# Patient Record
Sex: FEMALE | Race: WHITE | Employment: UNEMPLOYED | URBAN - METROPOLITAN AREA
[De-identification: names, ages, dates, MRNs, and addresses within clinical notes are randomized per-mention and may not be internally consistent; named-entity substitution may affect disease eponyms.]

---

## 2017-07-10 PROBLEM — G47.33 OBSTRUCTIVE SLEEP APNEA SYNDROME: Status: ACTIVE | Noted: 2017-07-10

## 2017-07-10 PROBLEM — E78.00 HIGH CHOLESTEROL: Status: ACTIVE | Noted: 2017-07-10

## 2017-07-10 PROBLEM — G25.81 RESTLESS LEG SYNDROME: Status: ACTIVE | Noted: 2017-07-10

## 2017-07-10 PROBLEM — E03.9 HYPOTHYROIDISM, UNSPECIFIED TYPE: Status: ACTIVE | Noted: 2017-07-10

## 2018-04-28 PROCEDURE — 81003 URINALYSIS AUTO W/O SCOPE: CPT | Performed by: INTERNAL MEDICINE

## 2018-04-28 PROCEDURE — 82607 VITAMIN B-12: CPT | Performed by: INTERNAL MEDICINE

## 2018-04-28 PROCEDURE — 82746 ASSAY OF FOLIC ACID SERUM: CPT | Performed by: INTERNAL MEDICINE

## 2018-10-06 PROBLEM — K42.0 INCARCERATED UMBILICAL HERNIA: Status: ACTIVE | Noted: 2018-10-06

## 2018-10-10 PROCEDURE — 81001 URINALYSIS AUTO W/SCOPE: CPT | Performed by: INTERNAL MEDICINE

## 2018-11-15 PROCEDURE — 81003 URINALYSIS AUTO W/O SCOPE: CPT | Performed by: INTERNAL MEDICINE

## 2019-01-07 PROBLEM — R10.2 SUPRAPUBIC PRESSURE: Status: ACTIVE | Noted: 2019-01-07

## 2019-05-28 PROBLEM — E89.0 POSTSURGICAL HYPOTHYROIDISM: Status: ACTIVE | Noted: 2017-07-10

## 2019-05-28 PROBLEM — Z85.850 HISTORY OF THYROID CANCER: Status: ACTIVE | Noted: 2019-05-28

## 2019-05-28 PROCEDURE — 86800 THYROGLOBULIN ANTIBODY: CPT | Performed by: INTERNAL MEDICINE

## 2019-05-28 PROCEDURE — 84432 ASSAY OF THYROGLOBULIN: CPT | Performed by: INTERNAL MEDICINE

## 2020-02-19 PROBLEM — M17.12 PRIMARY OSTEOARTHRITIS OF LEFT KNEE: Status: ACTIVE | Noted: 2020-02-19

## 2020-02-19 PROBLEM — M76.60 ACHILLES TENDINITIS, UNSPECIFIED LATERALITY: Status: ACTIVE | Noted: 2020-02-19

## 2020-05-21 PROBLEM — M17.11 PRIMARY OSTEOARTHRITIS OF RIGHT KNEE: Status: ACTIVE | Noted: 2020-05-21

## 2020-08-24 PROBLEM — M17.0 BILATERAL PRIMARY OSTEOARTHRITIS OF KNEE: Status: ACTIVE | Noted: 2020-08-24

## 2020-09-08 PROBLEM — S82.021A: Status: ACTIVE | Noted: 2020-09-08

## 2020-09-08 PROBLEM — M25.571 ACUTE RIGHT ANKLE PAIN: Status: ACTIVE | Noted: 2020-09-08

## 2020-10-20 PROBLEM — S82.021D: Status: ACTIVE | Noted: 2020-09-08

## 2020-10-20 PROBLEM — S52.022D: Status: ACTIVE | Noted: 2020-10-20

## 2021-04-14 PROBLEM — M17.0 PRIMARY OSTEOARTHRITIS OF BOTH KNEES: Status: ACTIVE | Noted: 2020-08-24

## 2025-04-09 RX ORDER — SEMAGLUTIDE 1.7 MG/.75ML
1.7 INJECTION, SOLUTION SUBCUTANEOUS WEEKLY
Status: ON HOLD | COMMUNITY
Start: 2024-11-26 | End: 2025-04-17

## 2025-04-11 NOTE — CM/SW NOTE
SW was notified the pt. Has been pre-operatively arranged with [facility].  Referral, order and face to face to be sent via [facility] post operatively.    TAYE Caputo ext [phone]

## 2025-04-15 RX ORDER — HYDROCODONE BITARTRATE AND ACETAMINOPHEN 10; 325 MG/1; MG/1
1 TABLET ORAL EVERY 6 HOURS PRN
COMMUNITY
Start: 2025-04-07

## 2025-04-15 RX ORDER — MELOXICAM 15 MG/1
15 TABLET ORAL DAILY
COMMUNITY
Start: 2025-01-29

## 2025-04-15 RX ORDER — TIRZEPATIDE 5 MG/.5ML
5 INJECTION, SOLUTION SUBCUTANEOUS WEEKLY
COMMUNITY
Start: 2025-04-15

## 2025-04-15 RX ORDER — ONDANSETRON 4 MG/1
4 TABLET, ORALLY DISINTEGRATING ORAL EVERY 8 HOURS PRN
COMMUNITY
Start: 2025-04-03

## 2025-04-15 RX ORDER — LEVOTHYROXINE SODIUM 150 UG/1
150 TABLET ORAL
COMMUNITY

## 2025-04-15 RX ORDER — KETOCONAZOLE 20 MG/G
1 CREAM TOPICAL DAILY
COMMUNITY
Start: 2025-03-01

## 2025-04-15 RX ORDER — MULTIVITAMIN,THERAPEUTIC
1 TABLET ORAL DAILY
COMMUNITY
End: 2025-04-15

## 2025-04-16 ENCOUNTER — ANESTHESIA EVENT (OUTPATIENT)
Dept: SURGERY | Facility: HOSPITAL | Age: 60
End: 2025-04-16
Payer: COMMERCIAL

## 2025-04-16 ENCOUNTER — LAB ENCOUNTER (OUTPATIENT)
Dept: LAB | Age: 60
End: 2025-04-16
Attending: ORTHOPAEDIC SURGERY
Payer: COMMERCIAL

## 2025-04-16 DIAGNOSIS — Z01.818 PRE-OP TESTING: ICD-10-CM

## 2025-04-16 LAB — MRSA DNA SPEC QL NAA+PROBE: NEGATIVE

## 2025-04-16 PROCEDURE — 87641 MR-STAPH DNA AMP PROBE: CPT

## 2025-04-16 NOTE — ANESTHESIA PREPROCEDURE EVALUATION
Anesthesia PreOp Note    HPI:     Millie Mñuiz is a 59 year old female who presents for preoperative consultation requested by: Augusto Reyna MD    Date of Surgery: 4/17/2025    Procedure(s):  Left total knee arthroplasty  Indication: Primary osteoarthritis of left knee    Relevant Problems   No relevant active problems       NPO:                         History Review:  Patient Active Problem List    Diagnosis Date Noted    Closed extra-articular fracture of olecranon process of left ulna with routine healing, subsequent encounter 10/20/2020    Acute right ankle pain 09/08/2020    Closed vertical fracture of right patella with routine healing, subsequent encounter 09/08/2020    Primary osteoarthritis of both knees 08/24/2020    Primary osteoarthritis of right knee 05/21/2020    Primary osteoarthritis of left knee 02/19/2020    Achilles tendinitis, unspecified laterality 02/19/2020    History of thyroid cancer 05/28/2019    Suprapubic pressure 01/07/2019    Incarcerated umbilical hernia 10/06/2018    Postsurgical hypothyroidism 07/10/2017    Obstructive sleep apnea syndrome 07/10/2017    High cholesterol 07/10/2017    Restless leg syndrome 07/10/2017       Past Medical History[1]    Past Surgical History[2]    Prescriptions Prior to Admission[3]  Current Medications and Prescriptions Ordered in Epic[4]    Allergies[5]    Family History[6]  Social Hx on file[7]    Available pre-op labs reviewed.             Vital Signs:  Body mass index is 40.77 kg/m².   height is 1.651 m (5' 5\") and weight is 111.1 kg (245 lb).   Vitals:    04/15/25 1210   Weight: 111.1 kg (245 lb)   Height: 1.651 m (5' 5\")        Anesthesia Evaluation     Patient summary reviewed and Nursing notes reviewed    History of anesthetic complications   Airway   Mallampati: II  TM distance: >3 FB  Neck ROM: full  Dental - Dentition appears grossly intact     Pulmonary - normal exam   (+) asthma, sleep apnea  Cardiovascular - normal exam  (+)  hypertension    ECG reviewed    Neuro/Psych    (+)  anxiety/panic attacks,  depression      GI/Hepatic/Renal    (+) GERD    Endo/Other    (+) diabetes mellitus    Comments: M. obese  Abdominal   (+) obese                 Anesthesia Plan:   ASA:  3  Plan:   Spinal  Post-op Pain Management: Saphenous block  Informed Consent Plan and Risks Discussed With:  Patient      I have informed Millie Muñiz and/or legal guardian or family member of the nature of the anesthetic plan, benefits, risks including possible dental damage if relevant, major complications, and any alternative forms of anesthetic management.   All of the patient's questions were answered to the best of my ability. The patient desires the anesthetic management as planned.  Dom Hutchins MD  4/16/2025 4:37 PM  Present on Admission:  **None**           [1]   Past Medical History:   Anxiety    Asthma (HCC)    seasonal    Cancer (HCC)    thyroid, melanoma    Depression    Disorder of thyroid    Esophageal reflux    Essential hypertension    High blood pressure    History of thyroid cancer    Hx of motion sickness    Hyperlipidemia    Neuropathy    Obesity    Osteoarthritis    PONV (postoperative nausea and vomiting)    Sleep apnea    CPAP    Visual impairment    glasses   [2]   Past Surgical History:  Procedure Laterality Date    Hernia surgery      Other surgical history  2005    melanoma removed from back    Other surgical history Right 10/2024    triceps repair    Thyroidectomy      Umbilical hernia repair  10/12/2018    lap. umbillical hernia repair w/mesh & partial omentectomy   [3]   No medications prior to admission.   [4]   Current Facility-Administered Medications Ordered in Epic   Medication Dose Route Frequency Provider Last Rate Last Admin    [START ON 4/17/2025] clonidine-EPINEPHrine-ropivacaine-ketorolac (CERTS) (Duraclon-Adrenalin-Naropin-Toradol) pain cocktail irrigation   Intra-articular Once (Intra-Op) Augusto Reyna MD         Current  Outpatient Medications Ordered in Epic   Medication Sig Dispense Refill    HYDROcodone-acetaminophen  MG Oral Tab Take 1 tablet by mouth every 6 (six) hours as needed for Pain.      Meloxicam 15 MG Oral Tab Take 1 tablet (15 mg total) by mouth daily.      ondansetron 4 MG Oral Tablet Dispersible Take 1 tablet (4 mg total) by mouth every 8 (eight) hours as needed for Nausea.      ketoconazole 2 % External Cream Apply 1 Application topically daily. Toe fungus      Tirzepatide-Weight Management (ZEPBOUND) 5 MG/0.5ML Subcutaneous Solution Auto-injector Inject 5 mg into the skin once a week.      ibuprofen 100 MG Oral Tab Take 1 tablet (100 mg total) by mouth every 8 (eight) hours as needed for Pain.      levothyroxine 150 MCG Oral Tab Take 1 tablet (150 mcg total) by mouth before breakfast.      semaglutide-weight management (WEGOVY) 1.7 MG/0.75ML Subcutaneous Solution Auto-injector Inject 0.75 mL (1.7 mg total) into the skin once a week.      SERTRALINE 50 MG Oral Tab TAKE 1 TABLET BY MOUTH EVERY DAY 90 tablet 1    Calcium Carbonate-Vitamin D 600-125 MG-UNIT Oral Tab Take by mouth. 1 tablet twice daily      EZETIMIBE 10 MG Oral Tab TAKE 1 TABLET BY MOUTH EVERY DAY AT NIGHT 90 tablet 0    HYDROCHLOROTHIAZIDE 25 MG Oral Tab TAKE 1 TABLET BY MOUTH EVERY DAY 90 tablet 0    Multiple Vitamin (MULTI-VITAMIN DAILY) Oral Tab Take by mouth in the morning.      Potassium (POTASSIMIN OR) Take by mouth in the morning.      Omega-3 Fatty Acids (FISH OIL OR) Take by mouth in the morning.      Pramipexole Dihydrochloride 0.25 MG Oral Tab Patient taking 4 tabs at bedtime  5    omeprazole 20 MG Oral Capsule Delayed Release Take 1 capsule (20 mg total) by mouth every morning before breakfast.      ascorbic acid 1000 MG Oral Tab Take 1 tablet (1,000 mg total) by mouth daily.      Cholecalciferol 50 MCG (2000 UT) Oral Cap Take 1 tablet by mouth daily.      aspirin 81 MG Oral Chew Tab Chew by mouth daily.      PROAIR  (90 Base)  MCG/ACT Inhalation Aero Soln INHALE 2 PUFFS FOUR TIMES DAILY AS NEEDED. (Patient not taking: No sig reported) 8.5 Inhaler 3   [5]   Allergies  Allergen Reactions    Bees FEVER    Triple Antibiotic [Neomycin-Bacitracin-Polymyxin] RASH   [6]   Family History  Problem Relation Age of Onset    Diabetes Father     Heart Disorder Father     Cancer Father     Lung Disorder Mother     Lipids Mother     Hypertension Mother     Arthritis Mother     Heart Disorder Brother    [7]   Social History  Socioeconomic History    Marital status:    Tobacco Use    Smoking status: Never    Smokeless tobacco: Never   Vaping Use    Vaping status: Never Used   Substance and Sexual Activity    Alcohol use: Not Currently     Comment: very rarely    Drug use: No

## 2025-04-17 ENCOUNTER — HOSPITAL ENCOUNTER (OUTPATIENT)
Facility: HOSPITAL | Age: 60
Discharge: HOME | End: 2025-04-19
Attending: ORTHOPAEDIC SURGERY | Admitting: ORTHOPAEDIC SURGERY
Payer: COMMERCIAL

## 2025-04-17 ENCOUNTER — APPOINTMENT (OUTPATIENT)
Dept: GENERAL RADIOLOGY | Facility: HOSPITAL | Age: 60
End: 2025-04-17
Attending: PHYSICIAN ASSISTANT
Payer: COMMERCIAL

## 2025-04-17 ENCOUNTER — ANESTHESIA (OUTPATIENT)
Dept: SURGERY | Facility: HOSPITAL | Age: 60
End: 2025-04-17
Payer: COMMERCIAL

## 2025-04-17 DIAGNOSIS — Z01.818 PRE-OP TESTING: Primary | ICD-10-CM

## 2025-04-17 PROBLEM — Z96.652 S/P TOTAL KNEE ARTHROPLASTY, LEFT: Status: ACTIVE | Noted: 2025-04-17

## 2025-04-17 PROCEDURE — 64447 NJX AA&/STRD FEMORAL NRV IMG: CPT | Performed by: ORTHOPAEDIC SURGERY

## 2025-04-17 PROCEDURE — 97161 PT EVAL LOW COMPLEX 20 MIN: CPT

## 2025-04-17 PROCEDURE — 97530 THERAPEUTIC ACTIVITIES: CPT

## 2025-04-17 PROCEDURE — 73560 X-RAY EXAM OF KNEE 1 OR 2: CPT | Performed by: PHYSICIAN ASSISTANT

## 2025-04-17 PROCEDURE — 88305 TISSUE EXAM BY PATHOLOGIST: CPT | Performed by: ORTHOPAEDIC SURGERY

## 2025-04-17 PROCEDURE — 0SRD06A REPLACEMENT OF LEFT KNEE JOINT WITH OXIDIZED ZIRCONIUM ON POLYETHYLENE SYNTHETIC SUBSTITUTE, UNCEMENTED, OPEN APPROACH: ICD-10-PCS | Performed by: ORTHOPAEDIC SURGERY

## 2025-04-17 PROCEDURE — 97116 GAIT TRAINING THERAPY: CPT

## 2025-04-17 PROCEDURE — 8E0Y0CZ ROBOTIC ASSISTED PROCEDURE OF LOWER EXTREMITY, OPEN APPROACH: ICD-10-PCS | Performed by: ORTHOPAEDIC SURGERY

## 2025-04-17 PROCEDURE — 97165 OT EVAL LOW COMPLEX 30 MIN: CPT

## 2025-04-17 PROCEDURE — 88311 DECALCIFY TISSUE: CPT | Performed by: ORTHOPAEDIC SURGERY

## 2025-04-17 DEVICE — IMPLANTABLE DEVICE: Type: IMPLANTABLE DEVICE | Site: KNEE | Status: FUNCTIONAL

## 2025-04-17 DEVICE — IMPLANTABLE DEVICE
Type: IMPLANTABLE DEVICE | Site: KNEE | Status: FUNCTIONAL
Brand: PERSONA® THE PERSONALIZED KNEE® OSSEOTI®

## 2025-04-17 DEVICE — IMPLANTABLE DEVICE
Type: IMPLANTABLE DEVICE | Site: KNEE | Status: FUNCTIONAL
Brand: PERSONA® PPS®

## 2025-04-17 DEVICE — IMPLANTABLE DEVICE
Type: IMPLANTABLE DEVICE | Site: KNEE | Status: FUNCTIONAL
Brand: PERSONA® VIVACIT-E®

## 2025-04-17 RX ORDER — ROPIVACAINE HYDROCHLORIDE 5 MG/ML
INJECTION, SOLUTION EPIDURAL; INFILTRATION; PERINEURAL
Status: COMPLETED | OUTPATIENT
Start: 2025-04-17 | End: 2025-04-17

## 2025-04-17 RX ORDER — ASCORBIC ACID 500 MG
1000 TABLET ORAL DAILY
Status: DISCONTINUED | OUTPATIENT
Start: 2025-04-17 | End: 2025-04-19

## 2025-04-17 RX ORDER — ONDANSETRON 2 MG/ML
4 INJECTION INTRAMUSCULAR; INTRAVENOUS EVERY 6 HOURS PRN
Status: DISCONTINUED | OUTPATIENT
Start: 2025-04-17 | End: 2025-04-17 | Stop reason: HOSPADM

## 2025-04-17 RX ORDER — DIPHENHYDRAMINE HYDROCHLORIDE 50 MG/ML
25 INJECTION, SOLUTION INTRAMUSCULAR; INTRAVENOUS ONCE AS NEEDED
Status: ACTIVE | OUTPATIENT
Start: 2025-04-17 | End: 2025-04-17

## 2025-04-17 RX ORDER — ALBUTEROL SULFATE 0.83 MG/ML
2.5 SOLUTION RESPIRATORY (INHALATION) EVERY 6 HOURS PRN
Status: DISCONTINUED | OUTPATIENT
Start: 2025-04-17 | End: 2025-04-19

## 2025-04-17 RX ORDER — FAMOTIDINE 10 MG/ML
20 INJECTION, SOLUTION INTRAVENOUS ONCE
Status: DISCONTINUED | OUTPATIENT
Start: 2025-04-17 | End: 2025-04-17 | Stop reason: HOSPADM

## 2025-04-17 RX ORDER — SENNOSIDES 8.6 MG
17.2 TABLET ORAL NIGHTLY
Status: DISCONTINUED | OUTPATIENT
Start: 2025-04-17 | End: 2025-04-19

## 2025-04-17 RX ORDER — PROCHLORPERAZINE EDISYLATE 5 MG/ML
5 INJECTION INTRAMUSCULAR; INTRAVENOUS EVERY 8 HOURS PRN
Status: DISCONTINUED | OUTPATIENT
Start: 2025-04-17 | End: 2025-04-17 | Stop reason: HOSPADM

## 2025-04-17 RX ORDER — FAMOTIDINE 10 MG/ML
20 INJECTION, SOLUTION INTRAVENOUS 2 TIMES DAILY
Status: DISCONTINUED | OUTPATIENT
Start: 2025-04-17 | End: 2025-04-17

## 2025-04-17 RX ORDER — DIPHENHYDRAMINE HCL 25 MG
25 CAPSULE ORAL EVERY 4 HOURS PRN
Status: DISCONTINUED | OUTPATIENT
Start: 2025-04-17 | End: 2025-04-19

## 2025-04-17 RX ORDER — HYDROCODONE BITARTRATE AND ACETAMINOPHEN 10; 325 MG/1; MG/1
1 TABLET ORAL EVERY 6 HOURS PRN
Status: DISCONTINUED | OUTPATIENT
Start: 2025-04-17 | End: 2025-04-19

## 2025-04-17 RX ORDER — SODIUM PHOSPHATE, DIBASIC AND SODIUM PHOSPHATE, MONOBASIC 7; 19 G/230ML; G/230ML
1 ENEMA RECTAL ONCE AS NEEDED
Status: DISCONTINUED | OUTPATIENT
Start: 2025-04-17 | End: 2025-04-19

## 2025-04-17 RX ORDER — PHENYLEPHRINE HCL 10 MG/ML
VIAL (ML) INJECTION AS NEEDED
Status: DISCONTINUED | OUTPATIENT
Start: 2025-04-17 | End: 2025-04-17 | Stop reason: SURG

## 2025-04-17 RX ORDER — SODIUM CHLORIDE, SODIUM LACTATE, POTASSIUM CHLORIDE, CALCIUM CHLORIDE 600; 310; 30; 20 MG/100ML; MG/100ML; MG/100ML; MG/100ML
INJECTION, SOLUTION INTRAVENOUS CONTINUOUS
Status: DISCONTINUED | OUTPATIENT
Start: 2025-04-17 | End: 2025-04-19

## 2025-04-17 RX ORDER — HYDROMORPHONE HYDROCHLORIDE 1 MG/ML
0.4 INJECTION, SOLUTION INTRAMUSCULAR; INTRAVENOUS; SUBCUTANEOUS EVERY 5 MIN PRN
Status: DISCONTINUED | OUTPATIENT
Start: 2025-04-17 | End: 2025-04-17 | Stop reason: HOSPADM

## 2025-04-17 RX ORDER — DOCUSATE SODIUM 100 MG/1
100 CAPSULE, LIQUID FILLED ORAL 2 TIMES DAILY
Status: DISCONTINUED | OUTPATIENT
Start: 2025-04-17 | End: 2025-04-19

## 2025-04-17 RX ORDER — HYDROMORPHONE HYDROCHLORIDE 1 MG/ML
0.2 INJECTION, SOLUTION INTRAMUSCULAR; INTRAVENOUS; SUBCUTANEOUS EVERY 5 MIN PRN
Status: DISCONTINUED | OUTPATIENT
Start: 2025-04-17 | End: 2025-04-17 | Stop reason: HOSPADM

## 2025-04-17 RX ORDER — PROCHLORPERAZINE EDISYLATE 5 MG/ML
5 INJECTION INTRAMUSCULAR; INTRAVENOUS EVERY 8 HOURS PRN
Status: DISCONTINUED | OUTPATIENT
Start: 2025-04-17 | End: 2025-04-19

## 2025-04-17 RX ORDER — POLYETHYLENE GLYCOL 3350 17 G/17G
17 POWDER, FOR SOLUTION ORAL DAILY PRN
Status: DISCONTINUED | OUTPATIENT
Start: 2025-04-17 | End: 2025-04-19

## 2025-04-17 RX ORDER — CHOLECALCIFEROL (VITAMIN D3) 25 MCG
1000 TABLET ORAL DAILY
Status: DISCONTINUED | OUTPATIENT
Start: 2025-04-17 | End: 2025-04-19

## 2025-04-17 RX ORDER — TRANEXAMIC ACID 650 MG/1
1300 TABLET ORAL ONCE
Status: COMPLETED | OUTPATIENT
Start: 2025-04-17 | End: 2025-04-17

## 2025-04-17 RX ORDER — ASPIRIN 81 MG/1
81 TABLET ORAL 2 TIMES DAILY
Status: DISCONTINUED | OUTPATIENT
Start: 2025-04-17 | End: 2025-04-19

## 2025-04-17 RX ORDER — FAMOTIDINE 20 MG/1
20 TABLET, FILM COATED ORAL ONCE
Status: DISCONTINUED | OUTPATIENT
Start: 2025-04-17 | End: 2025-04-17 | Stop reason: HOSPADM

## 2025-04-17 RX ORDER — LEVOTHYROXINE SODIUM 75 UG/1
150 TABLET ORAL
Status: DISCONTINUED | OUTPATIENT
Start: 2025-04-18 | End: 2025-04-19

## 2025-04-17 RX ORDER — PANTOPRAZOLE SODIUM 40 MG/1
40 TABLET, DELAYED RELEASE ORAL
Status: DISCONTINUED | OUTPATIENT
Start: 2025-04-18 | End: 2025-04-19

## 2025-04-17 RX ORDER — ACETAMINOPHEN 500 MG
1000 TABLET ORAL ONCE
Status: COMPLETED | OUTPATIENT
Start: 2025-04-17 | End: 2025-04-17

## 2025-04-17 RX ORDER — DOXEPIN HYDROCHLORIDE 50 MG/1
CAPSULE ORAL DAILY
Status: DISCONTINUED | OUTPATIENT
Start: 2025-04-17 | End: 2025-04-19

## 2025-04-17 RX ORDER — LIDOCAINE HYDROCHLORIDE 10 MG/ML
INJECTION, SOLUTION INFILTRATION; PERINEURAL
Status: COMPLETED | OUTPATIENT
Start: 2025-04-17 | End: 2025-04-17

## 2025-04-17 RX ORDER — DIPHENHYDRAMINE HYDROCHLORIDE 50 MG/ML
12.5 INJECTION, SOLUTION INTRAMUSCULAR; INTRAVENOUS EVERY 4 HOURS PRN
Status: DISCONTINUED | OUTPATIENT
Start: 2025-04-17 | End: 2025-04-19

## 2025-04-17 RX ORDER — BISACODYL 10 MG
10 SUPPOSITORY, RECTAL RECTAL
Status: DISCONTINUED | OUTPATIENT
Start: 2025-04-17 | End: 2025-04-19

## 2025-04-17 RX ORDER — MIDAZOLAM HYDROCHLORIDE 1 MG/ML
INJECTION INTRAMUSCULAR; INTRAVENOUS
Status: COMPLETED | OUTPATIENT
Start: 2025-04-17 | End: 2025-04-17

## 2025-04-17 RX ORDER — HYDROMORPHONE HYDROCHLORIDE 1 MG/ML
0.6 INJECTION, SOLUTION INTRAMUSCULAR; INTRAVENOUS; SUBCUTANEOUS EVERY 5 MIN PRN
Status: DISCONTINUED | OUTPATIENT
Start: 2025-04-17 | End: 2025-04-17 | Stop reason: HOSPADM

## 2025-04-17 RX ORDER — EZETIMIBE 10 MG/1
10 TABLET ORAL NIGHTLY
Status: DISCONTINUED | OUTPATIENT
Start: 2025-04-17 | End: 2025-04-19

## 2025-04-17 RX ORDER — FAMOTIDINE 20 MG/1
20 TABLET, FILM COATED ORAL 2 TIMES DAILY
Status: DISCONTINUED | OUTPATIENT
Start: 2025-04-17 | End: 2025-04-17

## 2025-04-17 RX ORDER — BUPIVACAINE HYDROCHLORIDE 7.5 MG/ML
INJECTION, SOLUTION INTRASPINAL
Status: COMPLETED | OUTPATIENT
Start: 2025-04-17 | End: 2025-04-17

## 2025-04-17 RX ORDER — HYDROCODONE BITARTRATE AND ACETAMINOPHEN 10; 325 MG/1; MG/1
1 TABLET ORAL ONCE AS NEEDED
Refills: 0 | Status: COMPLETED | OUTPATIENT
Start: 2025-04-17 | End: 2025-04-17

## 2025-04-17 RX ORDER — ONDANSETRON 2 MG/ML
4 INJECTION INTRAMUSCULAR; INTRAVENOUS EVERY 6 HOURS PRN
Status: DISCONTINUED | OUTPATIENT
Start: 2025-04-17 | End: 2025-04-19

## 2025-04-17 RX ORDER — NALOXONE HYDROCHLORIDE 0.4 MG/ML
0.08 INJECTION, SOLUTION INTRAMUSCULAR; INTRAVENOUS; SUBCUTANEOUS AS NEEDED
Status: DISCONTINUED | OUTPATIENT
Start: 2025-04-17 | End: 2025-04-17 | Stop reason: HOSPADM

## 2025-04-17 RX ORDER — DEXAMETHASONE SODIUM PHOSPHATE 10 MG/ML
INJECTION, SOLUTION INTRAMUSCULAR; INTRAVENOUS
Status: COMPLETED | OUTPATIENT
Start: 2025-04-17 | End: 2025-04-17

## 2025-04-17 RX ADMIN — MIDAZOLAM HYDROCHLORIDE 2 MG: 1 INJECTION INTRAMUSCULAR; INTRAVENOUS at 06:53:00

## 2025-04-17 RX ADMIN — LIDOCAINE HYDROCHLORIDE 5 ML: 10 INJECTION, SOLUTION INFILTRATION; PERINEURAL at 07:18:00

## 2025-04-17 RX ADMIN — SODIUM CHLORIDE, SODIUM LACTATE, POTASSIUM CHLORIDE, CALCIUM CHLORIDE: 600; 310; 30; 20 INJECTION, SOLUTION INTRAVENOUS at 09:03:00

## 2025-04-17 RX ADMIN — DEXAMETHASONE SODIUM PHOSPHATE 10 MG: 10 INJECTION, SOLUTION INTRAMUSCULAR; INTRAVENOUS at 06:53:00

## 2025-04-17 RX ADMIN — LIDOCAINE HYDROCHLORIDE 5 ML: 10 INJECTION, SOLUTION INFILTRATION; PERINEURAL at 06:53:00

## 2025-04-17 RX ADMIN — LIDOCAINE HYDROCHLORIDE 5 ML: 10 INJECTION, SOLUTION INFILTRATION; PERINEURAL at 07:26:00

## 2025-04-17 RX ADMIN — PHENYLEPHRINE HCL 100 MCG: 10 MG/ML VIAL (ML) INJECTION at 07:41:00

## 2025-04-17 RX ADMIN — BUPIVACAINE HYDROCHLORIDE 1.5 ML: 7.5 INJECTION, SOLUTION INTRASPINAL at 07:18:00

## 2025-04-17 RX ADMIN — ROPIVACAINE HYDROCHLORIDE 30 ML: 5 INJECTION, SOLUTION EPIDURAL; INFILTRATION; PERINEURAL at 06:53:00

## 2025-04-17 NOTE — OPERATIVE REPORT
PATIENT'S NAME: Millie Muñiz    ATTENDING PHYSICIAN: Augusto Reyna MD   OPERATING PHYSICIAN: Augusto Reyna MD   PATIENT ACCOUNT#: [id number]   MEDICAL RECORD #: [id number]   YOB: 1965          ADMISSION DATE: 4/17/2025           OPERATION DATE: 4/17/2025       OPERATIVE REPORT        PREOPERATIVE DIAGNOSIS: Left knee osteoarthritis.  POSTOPERATIVE DIAGNOSIS:  Left knee osteoarthritis.  PROCEDURE:  Robotic assisted left total knee arthroplasty.     COMPLICATIONS:  None.     ANESTHESIA:  Spinal plus adductor block.     SPECIMENS REMOVED:  Bony components sent to Pathology.     IMPLANTS USED:  Karen Persona cementless femoral component size 7 left standard CR; cementless tibial component, size E; cementless patella, size  35; articular insert, size 14.       TOURNIQUET TIME:  approx 46 minutes.     BLOOD LOSS:  Approximately 100 mL.     ASSISTANTS:  Mishel Castaneda PA-C, and SAHIL Hernandez.       INDICATIONS:  The patient is a pleasant 59-year-old woman who has failed nonoperative treatment of left knee osteoarthritis.  We discussed risks and benefits of operative intervention going forward with the surgery to include infection, stiffness, neurovascular injury, anesthetic risk, continued pain, possible need for further surgery, DVT, PE. She understood these risks and desired to proceed.     OPERATIVE TECHNIQUE:  After adequate induction of spinal anesthesia and adductor block, left lower extremity was prepped and draped in the usual sterile fashion after application of well-padded tourniquet to the left upper thigh.  Prior to the case, the leg was exsanguinated, tourniquet taken up to 250 mmHg.  At this point, a standard midline incision was made, followed by a medial parapatellar arthrotomy.  Patella was everted and knee was flexed.  Fat pad was removed.  ACL was removed.  At this time, tracking pins were placed.  The robotic arm was then used to placed distal femoral pins, drill 4-in-1  cutting holes, and place proximal tibial pins.  Distal cut was made.  A 4-in-1 cutting block was placed.  Anterior, posterior, and chamfer cuts were then made.  PCL retractor was used to gain access to the proximal tibia, and menisci were then removed at this time. Proximal tibia cut was made.  Flexion-extension gaps were found to be stable at approximately 14 mm.  Excellent bone quality was noted, as such decision was made to proceed with press fit components.  At this time, a tibial tray was placed in appropriate rotation and pinned.  Femoral component was placed and pinned.  Trial polyethylene was placed.  The patella was rongeured free of osteophytes, reamed down to appropriate size.  A 35 mm patellar button was then medialized once peg holes were drilled.  Knee was taken through range of motion and the patella was found to track well.  At this time, the femur was prepared with a drill.  Tibia was prepared with a drill and punch.  All trials were removed.  Bony surfaces were irrigated. Cementless components were then placed, followed by a trial insert.  After assessing stability, the trial polyethylene was removed.  The final polyethylene was placed after appropriate irrigation.  Knee was irrigated thoroughly one additional time.  The arthrotomy was closed with #1 Ethibond, subcutaneous tissue with 2-0 Vicryl, skin with staples.  It should be noted that prior to closure, pain cocktail was used for local anesthetic.  Sterile dressings were applied.  The patient tolerated the procedure well.  She was taken to PACU in stable condition.  Please note that prior to the case a time-out was completed, correct site was identified, and preoperative antibiotics and TXA were given.  Please also note that assistants were required for the case to help with different sawing and drilling techniques.      Augusto Reyna MD

## 2025-04-17 NOTE — H&P
History & Physical Examination    Patient Name: Millie Muñiz  MRN: [id number]  [facility]: [id number]  YOB: 1965    Diagnosis: LEFT KNEE OA    Present Illness: FAILED CONSERVATIVE MEASURES    Prescriptions Prior to Admission[1]  Current Hospital Medications[2]    Allergies: Allergies[3]    Past Medical History[4]  Past Surgical History[5]  Family History[6]  Social History     Tobacco Use    Smoking status: Never    Smokeless tobacco: Never   Substance Use Topics    Alcohol use: Not Currently     Comment: very rarely       SYSTEM Check if Review is Normal Check if Physical Exam is Normal If not normal, please explain:   HEENT [ x] [ x]    NECK & BACK [ x] [ x]    HEART [ x] [ x]    LUNGS [ x] [ x]    ABDOMEN [ x] [ x]    UROGENITAL [ x] [ x]    EXTREMITIES [ ] [ ] L +JLT, +effusion   OTHER        [ x ] I have discussed the risks and benefits and alternatives with the patient/family.  They understand and agree to proceed with plan of care.  [ x ] I have reviewed the History and Physical done within the last 30 days.  Any changes noted above.    PLAN: LEFT TKA    Augusto Reyna MD  4/17/2025  6:58 AM           [1]   Medications Prior to Admission   Medication Sig Dispense Refill Last Dose/Taking    HYDROcodone-acetaminophen  MG Oral Tab Take 1 tablet by mouth every 6 (six) hours as needed for Pain.   Taking As Needed    Meloxicam 15 MG Oral Tab Take 1 tablet (15 mg total) by mouth daily.   4/9/2025    ketoconazole 2 % External Cream Apply 1 Application topically daily. Toe fungus   Taking    Tirzepatide-Weight Management (ZEPBOUND) 5 MG/0.5ML Subcutaneous Solution Auto-injector Inject 5 mg into the skin once a week.   Taking    ibuprofen 100 MG Oral Tab Take 1 tablet (100 mg total) by mouth every 8 (eight) hours as needed for Pain.   4/9/2025    levothyroxine 150 MCG Oral Tab Take 1 tablet (150 mcg total) by mouth before breakfast.   4/17/2025 at  4:00 AM    semaglutide-weight management (WEGOVY) 1.7  MG/0.75ML Subcutaneous Solution Auto-injector Inject 0.75 mL (1.7 mg total) into the skin once a week.   4/9/2025 Morning    SERTRALINE 50 MG Oral Tab TAKE 1 TABLET BY MOUTH EVERY DAY 90 tablet 1 4/16/2025 at  8:00 AM    Calcium Carbonate-Vitamin D 600-125 MG-UNIT Oral Tab Take by mouth. 1 tablet twice daily   4/9/2025    EZETIMIBE 10 MG Oral Tab TAKE 1 TABLET BY MOUTH EVERY DAY AT NIGHT 90 tablet 0 4/16/2025 at  8:00 AM    HYDROCHLOROTHIAZIDE 25 MG Oral Tab TAKE 1 TABLET BY MOUTH EVERY DAY 90 tablet 0 4/16/2025 Noon    Multiple Vitamin (MULTI-VITAMIN DAILY) Oral Tab Take by mouth in the morning.   4/9/2025    Potassium (POTASSIMIN OR) Take by mouth in the morning.   4/9/2025    Omega-3 Fatty Acids (FISH OIL OR) Take by mouth in the morning.   4/9/2025    Pramipexole Dihydrochloride 0.25 MG Oral Tab Patient taking 4 tabs at bedtime  5 4/16/2025 at  5:00 PM    omeprazole 20 MG Oral Capsule Delayed Release Take 1 capsule (20 mg total) by mouth every morning before breakfast.   4/17/2025 at  4:00 AM    ascorbic acid 1000 MG Oral Tab Take 1 tablet (1,000 mg total) by mouth daily.   4/9/2025    Cholecalciferol 50 MCG (2000 UT) Oral Cap Take 1 tablet by mouth daily.   4/9/2025    ondansetron 4 MG Oral Tablet Dispersible Take 1 tablet (4 mg total) by mouth every 8 (eight) hours as needed for Nausea.   Unknown    aspirin 81 MG Oral Chew Tab Chew by mouth daily.       PROAIR  (90 Base) MCG/ACT Inhalation Aero Soln INHALE 2 PUFFS FOUR TIMES DAILY AS NEEDED. (Patient not taking: No sig reported) 8.5 Inhaler 3 Unknown   [2]   Current Facility-Administered Medications   Medication Dose Route Frequency    lactated ringers infusion   Intravenous Continuous    famotidine (Pepcid) tab 20 mg  20 mg Oral Once    Or    famotidine (Pepcid) 20 mg/2mL injection 20 mg  20 mg Intravenous Once    ceFAZolin (Ancef) 2g in 10mL IV syringe premix  2 g Intravenous Once    clonidine-EPINEPHrine-ropivacaine-ketorolac (CERTS)  (Duraclon-Adrenalin-Naropin-Toradol) pain cocktail irrigation   Intra-articular Once (Intra-Op)   [3]   Allergies  Allergen Reactions    Bees FEVER    Triple Antibiotic [Neomycin-Bacitracin-Polymyxin] RASH   [4]   Past Medical History:   Anxiety    Asthma (HCC)    seasonal    Cancer (HCC)    thyroid, melanoma    Depression    Disorder of thyroid    Esophageal reflux    Essential hypertension    High blood pressure    History of thyroid cancer    Hx of motion sickness    Hyperlipidemia    Neuropathy    Obesity    Osteoarthritis    PONV (postoperative nausea and vomiting)    Sleep apnea    CPAP    Visual impairment    glasses   [5]   Past Surgical History:  Procedure Laterality Date    Hernia surgery      Other surgical history  2005    melanoma removed from back    Other surgical history Right 10/2024    triceps repair    Thyroidectomy      Umbilical hernia repair  10/12/2018    lap. umbillical hernia repair w/mesh & partial omentectomy   [6]   Family History  Problem Relation Age of Onset    Diabetes Father     Heart Disorder Father     Cancer Father     Lung Disorder Mother     Lipids Mother     Hypertension Mother     Arthritis Mother     Heart Disorder Brother

## 2025-04-17 NOTE — DISCHARGE INSTRUCTIONS
Keep dressing intact for 1 week, changing only if >50% saturated  Change dressing in 1 week if not previously changed  May shower with water-proof dressing intact  Keep leg elevated when not ambulating, no pillow directly under knee, keep leg straight with foot higher than knee.  Use pain medication as provided  Use over the counter stool softener AND laxative daily while taking pain medication -   Colace 100mg twice a day AND Senokot 17.2mg every night.    If no bowel movement in 2 days, obtain Magnesium Citrate and take as directed.  Start 81mg Aspirin tonight with dinner. Continue 81mg twice a day for 6 weeks.  Contact number provided in 24 hours if you have not heard from home health services for RN and physical therapy home visits  Follow-up in office in 2 weeks as scheduled      HOME INSTRUCTIONS  [facility]G HOME CARE INSTRUCTIONS: POST-OP ANESTHESIA  The medication that you received for sedation or general anesthesia can last up to 24 hours. Your judgment and reflexes may be altered, even if you feel like your normal self.      We Recommend:   Do not drive any motor vehicle or bicycle   Avoid mowing the lawn, playing sports, or working with power tools/applicances (power saws, electric knives or mixers)   That you have someone stay with you on your first night home   Do not drink alcohol or take sleeping pills or tranquilizers   Do not sign legal documents within 24 hours of your procedure   If you had a nerve block for your surgery, take extra care not to put any pressure on your arm or hand for 24 hours    It is normal:  For you to have a sore throat if you had a breathing tube during surgery (while you were asleep!). The sore throat should get better within 48 hours. You can gargle with warm salt water (1/2 tsp in 4 oz warm water) or use a throat lozenge for comfort  To feel muscle aches or soreness especially in the abdomen, chest or neck. The achy feeling should go away in the next 24 hours  To feel weak,  sleepy or \"wiped out\". Your should start feeling better in the next 24 hours.   To experience mild discomforts such as sore lip or tongue, headache, cramps, gas pains or a bloated feeling in your abdomen.   To experience mild back pain or soreness for a day or two if you had spinal or epidural anesthesia.   If you had laparoscopic surgery, to feel shoulder pain or discomfort on the day of surgery.   For some patients to have nausea after surgery/anesthesia    If you feel nausea or experience vomiting:   Try to move around less.   Eat less than usual or drink only liquids until the next morning   Nausea should resolve in about 24 hours    If you have a problem when you are at home:    Call your surgeons office   Discharge Instructions: After Your Surgery  You’ve just had surgery. During surgery, you were given medicine called anesthesia to keep you relaxed and free of pain. After surgery, you may have some pain or nausea. This is common. Here are some tips for feeling better and getting well after surgery.   Going home  Your healthcare provider will show you how to take care of yourself when you go home. They'll also answer your questions. Have an adult family member or friend drive you home. For the first 24 hours after your surgery:   Don't drive or use heavy equipment.  Don't make important decisions or sign legal papers.  Take medicines as directed.  Don't drink alcohol.  Have someone stay with you, if needed. They can watch for problems and help keep you safe.  Be sure to go to all follow-up visits with your healthcare provider. And rest after your surgery for as long as your provider tells you to.   Coping with pain  If you have pain after surgery, pain medicine will help you feel better. Take it as directed, before pain becomes severe. Also, ask your healthcare provider or pharmacist about other ways to control pain. This might be with heat, ice, or relaxation. And follow any other instructions your surgeon or  nurse gives you.      Stay on schedule with your medicine.     Tips for taking pain medicine  To get the best relief possible, remember these points:   Pain medicines can upset your stomach. Taking them with a little food may help.  Most pain relievers taken by mouth need at least 20 to 30 minutes to start to work.  Don't wait till your pain becomes severe before you take your medicine. Try to time your medicine so that you can take it before starting an activity. This might be before you get dressed, go for a walk, or sit down for dinner.  Constipation is a common side effect of some pain medicines. Call your healthcare provider before taking any medicines such as laxatives or stool softeners to help ease constipation. Also ask if you should skip any foods. Drinking lots of fluids and eating foods such as fruits and vegetables that are high in fiber can also help. Remember, don't take laxatives unless your surgeon has prescribed them.  Drinking alcohol and taking pain medicine can cause dizziness and slow your breathing. It can even be deadly. Don't drink alcohol while taking pain medicine.  Pain medicine can make you react more slowly to things. Don't drive or run machinery while taking pain medicine.  Your healthcare provider may tell you to take acetaminophen to help ease your pain. Ask them how much you're supposed to take each day. Acetaminophen or other pain relievers may interact with your prescription medicines or other over-the-counter (OTC) medicines. Some prescription medicines have acetaminophen and other ingredients in them. Using both prescription and OTC acetaminophen for pain can cause you to accidentally overdose. Read the labels on your OTC medicines with care. This will help you to clearly know the list of ingredients, how much to take, and any warnings. It may also help you not take too much acetaminophen. If you have questions or don't understand the information, ask your pharmacist or  healthcare provider to explain it to you before you take the OTC medicine.   Managing nausea  Some people have an upset stomach (nausea) after surgery. This is often because of anesthesia, pain, or pain medicine, less movement of food in the stomach, or the stress of surgery. These tips will help you handle nausea and eat healthy foods as you get better. If you were on a special food plan before surgery, ask your healthcare provider if you should follow it while you get better. Check with your provider on how your eating should progress. It may depend on the surgery you had. These general tips may help:   Don't push yourself to eat. Your body will tell you when to eat and how much.  Start off with clear liquids and soup. They're easier to digest.  Next try semi-solid foods as you feel ready. These include mashed potatoes, applesauce, and gelatin.  Slowly move to solid foods. Don’t eat fatty, rich, or spicy foods at first.  Don't force yourself to have 3 large meals a day. Instead eat smaller amounts more often.  Take pain medicines with a small amount of solid food, such as crackers or toast. This helps prevent nausea.  When to call your healthcare provider  Call your healthcare provider right away if you have any of these:   You still have too much pain, or the pain gets worse, after taking the medicine. The medicine may not be strong enough. Or there may be a complication from the surgery.  You feel too sleepy, dizzy, or groggy. The medicine may be too strong.  Side effects such as nausea or vomiting. Your healthcare provider may advise taking other medicines to .  Skin changes such as rash, itching, or hives. This may mean you have an allergic reaction. Your provider may advise taking other medicines.  The incision looks different (for instance, part of it opens up).  Bleeding or fluid leaking from the incision site, and weren't told to expect that.  Fever of 100.4°F (38°C) or higher, or as directed by your  provider.  Call 911  Call 911 right away if you have:   Trouble breathing  Facial swelling    If you have obstructive sleep apnea   You were given anesthesia medicine during surgery to keep you comfortable and free of pain. After surgery, you may have more apnea spells because of this medicine and other medicines you were given. The spells may last longer than normal.    At home:  Keep using the continuous positive airway pressure (CPAP) device when you sleep. Unless your healthcare provider tells you not to, use it when you sleep, day or night. CPAP is a common device used to treat obstructive sleep apnea.  Talk with your provider before taking any pain medicine, muscle relaxants, or sedatives. Your provider will tell you about the possible dangers of taking these medicines.  Contact your provider if your sleeping changes a lot even when taking medicines as directed.  Greg last reviewed this educational content on 10/1/2021  © 9795-7917 [employer]. All rights reserved. This information is not intended as a substitute for professional medical care. Always follow your healthcare professional's instructions.      Home health  [facility]   [phone]

## 2025-04-17 NOTE — H&P
[facility] Hospitalist Consult Note   PCP: Kang Rojas MD  Attending: Dr. Reyna  Reason for Consult: Post operative medical management  Date of surgery: 4/17/25  Surgery: Left total knee arthroplasty     History of Present Illness: Patient is a 58 y/o F w/ PMHx Asthma, HTN, HLD, Hypothyroid, GERD, BEBETO/MDD, KATHERINE, who presents post op.  There were plans for patient to go home after surgery if she passed physical therapy but she did not.  Seen post op and going well.  Pain is better controlled but currently has left foot pain.  Denies nausea or SOB.   at bedside.     Review of Systems  Comprehensive ROS reviewed and negative except for what's stated above.     PMH  Past Medical History[1]     PSH  Past Surgical History[2]     ALL:  Allergies[3]     Home Medications:  Medications Taking[4]      Soc Hx  Social History     Tobacco Use    Smoking status: Never    Smokeless tobacco: Never   Substance Use Topics    Alcohol use: Not Currently     Comment: very rarely      Fam Hx  Family History[5]    OBJECTIVE:  /66 (BP Location: Right arm)   Pulse 87   Temp 97.7 °F (36.5 °C) (Temporal)   Resp 14   Ht 5' 5\" (1.651 m)   Wt 245 lb (111.1 kg)   SpO2 95%   BMI 40.77 kg/m²   General: Alert, no acute distress  HEENT: oral mucosa normal   Neck: non tender, no adenopathy   Lungs: clear to ausculation bilaterally  Heart: Regular rate and rhythm  Abdomen: soft, non tender, non distended   Extremities: No edema, left knee dressing and brace in place   Skin: no new rash, normal color  Neuro: 5/5 strength in bilateral upper extremities, normal sensations, 5/5 left foot strength and full ROM  Psych: appropriate affect     Diagnostic Data:    CBC/Chem  No results for input(s): \"WBC\", \"HGB\", \"MCV\", \"PLT\", \"BAND\", \"INR\" in the last 168 hours.    Invalid input(s): \"LYM#\", \"MONO#\", \"BASOS#\", \"EOSIN#\"    No results for input(s): \"NA\", \"K\", \"CL\", \"CO2\", \"BUN\", \"CREATSERUM\", \"GLU\", \"CA\", \"CAION\", \"MG\", \"PHOS\" in the last 168  hours.    No results for input(s): \"ALT\", \"AST\", \"ALB\", \"AMYLASE\", \"LIPASE\", \"LDH\" in the last 168 hours.    Invalid input(s): \"ALPHOS\", \"TBIL\", \"DBIL\", \"TPROT\"    No results for input(s): \"TROP\" in the last 168 hours.    Radiology: No results found.    ASSESSMENT / PLAN:   Patient is a 58 y/o F w/ PMHx Asthma, HTN, HLD, Hypothyroid, GERD, BEBETO/MDD, KATHERINE, who presents post op s/p Left total knee arthroplasty.     POD#0 s/p Left total knee arthroplasty   - pain and nausea control  - IVF, advance diet as tolerated   - Ziegler/drain care   - am labs  - PT/OT/SW  - AC per ortho     Asthma - MDI   HTN - hydrochlorothiazide as tolerated   HLD - Zetia   Hypothyroid - synthroid   GERD - PPI  BEBETO/MDD - sertraline   KATHERINE - cpap as tolerated   MO - hold weight loss meds     Fluids: post op   Diet: ADAT  DVT prophylaxis: ASA   Code status: Full    Disposition: post op care, PT/OT, AM labs     Marcos Bain MD  [facility] Hospitalist   Answering service [phone]           [1]   Past Medical History:   Anxiety    Asthma (HCC)    seasonal    Cancer (HCC)    thyroid, melanoma    Depression    Disorder of thyroid    Esophageal reflux    Essential hypertension    High blood pressure    History of thyroid cancer    Hx of motion sickness    Hyperlipidemia    Neuropathy    Obesity    Osteoarthritis    PONV (postoperative nausea and vomiting)    Sleep apnea    CPAP    Visual impairment    glasses   [2]   Past Surgical History:  Procedure Laterality Date    Hernia surgery      Other surgical history  2005    melanoma removed from back    Other surgical history Right 10/2024    triceps repair    Thyroidectomy      Umbilical hernia repair  10/12/2018    lap. umbillical hernia repair w/mesh & partial omentectomy   [3]   Allergies  Allergen Reactions    Bees FEVER    Triple Antibiotic [Neomycin-Bacitracin-Polymyxin] RASH   [4]   Outpatient Medications Marked as Taking for the 4/17/25 encounter (Hospital Encounter)   Medication Sig  Dispense Refill    HYDROcodone-acetaminophen  MG Oral Tab Take 1 tablet by mouth every 6 (six) hours as needed for Pain.      Meloxicam 15 MG Oral Tab Take 1 tablet (15 mg total) by mouth daily.      ketoconazole 2 % External Cream Apply 1 Application topically daily. Toe fungus      Tirzepatide-Weight Management (ZEPBOUND) 5 MG/0.5ML Subcutaneous Solution Auto-injector Inject 5 mg into the skin once a week.      ibuprofen 100 MG Oral Tab Take 1 tablet (100 mg total) by mouth every 8 (eight) hours as needed for Pain.      levothyroxine 150 MCG Oral Tab Take 1 tablet (150 mcg total) by mouth before breakfast.      semaglutide-weight management (WEGOVY) 1.7 MG/0.75ML Subcutaneous Solution Auto-injector Inject 0.75 mL (1.7 mg total) into the skin once a week.      SERTRALINE 50 MG Oral Tab TAKE 1 TABLET BY MOUTH EVERY DAY 90 tablet 1    Calcium Carbonate-Vitamin D 600-125 MG-UNIT Oral Tab Take by mouth. 1 tablet twice daily      EZETIMIBE 10 MG Oral Tab TAKE 1 TABLET BY MOUTH EVERY DAY AT NIGHT 90 tablet 0    HYDROCHLOROTHIAZIDE 25 MG Oral Tab TAKE 1 TABLET BY MOUTH EVERY DAY 90 tablet 0    Multiple Vitamin (MULTI-VITAMIN DAILY) Oral Tab Take by mouth in the morning.      Potassium (POTASSIMIN OR) Take by mouth in the morning.      Omega-3 Fatty Acids (FISH OIL OR) Take by mouth in the morning.      Pramipexole Dihydrochloride 0.25 MG Oral Tab Patient taking 4 tabs at bedtime  5    omeprazole 20 MG Oral Capsule Delayed Release Take 1 capsule (20 mg total) by mouth every morning before breakfast.     [5]   Family History  Problem Relation Age of Onset    Diabetes Father     Heart Disorder Father     Cancer Father     Lung Disorder Mother     Lipids Mother     Hypertension Mother     Arthritis Mother     Heart Disorder Brother

## 2025-04-17 NOTE — ANESTHESIA PROCEDURE NOTES
Spinal Block    Date/Time: 4/17/2025 7:18 AM    Performed by: Dom Hutchins MD  Authorized by: Dom Hutchins MD      General Information and Staff    Start Time:  4/17/2025 7:18 AM  End Time:  4/17/2025 7:27 AM  Anesthesiologist:  Dom Hutchins MD  Performed by:  Anesthesiologist  Site identification: surface landmarks    Reason for Block: at surgeon's request and surgical anesthesia    Preanesthetic Checklist: patient identified, IV checked, risks and benefits discussed, monitors and equipment checked, pre-op evaluation, timeout performed, anesthesia consent and sterile technique used      Procedure Details    Patient Position:  Sitting  Prep: ChloraPrep    Monitoring:  Cardiac monitor  Approach:  Midline  Location:  L3-4  Injection Technique:  Single-shot    Needle    Needle Type:  Pencil-tip  Needle Gauge:  24 G  Needle Length:  4 in  Needle Insertion Depth:  10.2    Assessment    Sensory Level:   Events: clear CSF, CSF aspirated, well tolerated and blood negative      Additional Comments     Needle pushed all the way to hub with rt paramedian approach

## 2025-04-17 NOTE — OCCUPATIONAL THERAPY NOTE
OCCUPATIONAL THERAPY EVALUATION - INPATIENT     [facility]  Evaluation Date: 2025  Type of Evaluation: Initial  Presenting Problem: L TKA    Physician Order: IP Consult to Occupational Therapy  Reason for Therapy: ADL/IADL Dysfunction and Discharge Planning    OCCUPATIONAL THERAPY ASSESSMENT   Patient is a 59 year old female admitted 2025 for L TKA; WBAT.  Prior to admission, patient's baseline is independent.  Patient is currently functioning below baseline with self care and basic mobility.  Patient is requiring up to Min A as a result of the following impairments: activity tolerance, medical status. Occupational Therapy will continue to follow for duration of hospitalization.    Patient will benefit from continued skilled OT Services with no additional skilled services but increased support at home. Patient will continue with PT; anticipate pt will dc home SDS, but will leave on OT schedule in case change in d/c occurs.     PLAN DURING HOSPITALIZATION  OT Device Recommendations: TBD  OT Treatment Plan: Balance activities, ADL training, Energy conservation/work simplification techniques, Functional transfer training, UE strengthening/ROM, Endurance training, Patient/Family education, Patient/Family training, Compensatory technique education     OCCUPATIONAL THERAPY MEDICAL/SOCIAL HISTORY   Problem List   Active Problems:    * No active hospital problems. *    HOME SITUATION  Type of Home: House  Home Layout: Two level  Lives With: Spouse  Toilet and Equipment: Standard height toilet  Shower/Tub and Equipment: Tub-shower combo  Drives: Yes  Patient Regularly Uses: None  SUBJECTIVE  I feel like Im going to get sick     OCCUPATIONAL THERAPY EXAMINATION   OBJECTIVE  Precautions: Limb alert - left  Fall Risk: High fall risk    WEIGHT BEARING RESTRICTION  L Lower Extremity: Weight Bearing as Tolerated    PAIN ASSESSMENT  Ratin  Location: knee    ACTIVITY TOLERANCE  Pulse: 60        BP:  (!) 84/50 (84/50 standing; 98/60 after laying down)             O2 SATURATIONS       COGNITION  WNL    RANGE OF MOTION   Upper extremity ROM is within functional limits     STRENGTH ASSESSMENT  Upper extremity strength is within functional limits     ACTIVITIES OF DAILY LIVING ASSESSMENT  AM-PAC ‘6-Clicks’ Inpatient Daily Activity Short Form  How much help from another person does the patient currently need…  -   Putting on and taking off regular lower body clothing?: A Little  -   Bathing (including washing, rinsing, drying)?: A Little  -   Toileting, which includes using toilet, bedpan or urinal? : A Little  -   Putting on and taking off regular upper body clothing?: None  -   Taking care of personal grooming such as brushing teeth?: None  -   Eating meals?: None    AM-PAC Score:  Score: 21  Approx Degree of Impairment: 32.79%  Standardized Score (AM-PAC Scale): 44.27  CMS Modifier (G-Code): CJ    FUNCTIONAL TRANSFER ASSESSMENT  Sit to Stand: Edge of Bed  Edge of Bed: Minimal Assist    BED MOBILITY  Rolling: Stand-by Assist  Supine to Sit : Stand-by Assist  Sit to Supine (OT): Minimal Assist  Scooting: min    BALANCE ASSESSMENT  Static Sitting: Supervision  Static Standing: Minimal Assist    FUNCTIONAL ADL ASSESSMENT  Eating: Independent  Grooming Seated: Independent  Bathing Seated: Minimal Assist  UB Dressing Seated: Stand-by Assist  LB Dressing Seated: Minimal Assist  Toileting Seated: Minimal Assist    THERAPEUTIC EXERCISE     Skilled Therapy Provided: Pt care coordinated with PT; RN provided consent to proceed; pt rcvd in bed with spouse present; pt educated on role of therapy; goals for session, WB status, and activity guidelines with return home; pt reports spouse will be assisting at d/c for the next few days; she was able to transition to side of bed with CGA; pt c/o nausea but was able to complete dressing with setup while seated; pt's with increasing dizziness and nausea while standing so returned to  EOB; Min A for pulling pants up and over hips; Min a for standing; pt returned to EOB and BP assessed at 84/50;returned back to bed with assist and RN made aware; pt reports feeling better laying down; deferred further activities; BP checked at end of session and improved to 98/60; will continue to follow.     EDUCATION PROVIDED  Patient Education : Role of Occupational Therapy; Plan of Care; Discharge Recommendations; Functional Transfer Techniques; Fall Prevention; Weight Bear Status; Surgical Precautions; Posture/Positioning  Patient's Response to Education: Verbalized Understanding    The patient's Approx Degree of Impairment: 32.79% has been calculated based on documentation in the [facility] '6 clicks' Inpatient Daily Activity Short Form.  Research supports that patients with this level of impairment may benefit from HH.  Final disposition will be made by interdisciplinary medical team.    Patient End of Session: In bed, With  staff    OT Goals  Patient self-stated goal is: to go home      Patient will complete LE dressing with Mod I   Comment:     Patient will complete toilet transfer with Mod I  Comment:     Patient will complete self care task at sink level with Mod I   Comment:     Comment:         Goals  on:   Frequency:1-2 more sessions    Patient Evaluation Complexity Level:   Occupational Profile/Medical History LOW - Brief history including review of medical or therapy records    Specific performance deficits impacting engagement in ADL/IADL LOW  1 - 3 performance deficits    Client Assessment/Performance Deficits LOW - No comorbidities nor modifications of tasks    Clinical Decision Making LOW - Analysis of occupational profile, problem-focused assessments, limited treatment options    Overall Complexity LOW     OT Session Time: 18 minutes  Self-Care Home Management: 9minutes      Rui Quintero, Occupational Therapist, OTR/L ext [phone]

## 2025-04-17 NOTE — ANESTHESIA POSTPROCEDURE EVALUATION
Patient: Millie Muñiz    Procedure Summary       Date: 04/17/25 Room / Location: [facility] MAIN OR  / [facility] MAIN OR    Anesthesia Start: 0714 Anesthesia Stop: 0910    Procedure: Left total knee arthroplasty (Left: Knee) Diagnosis: (Primary osteoarthritis of left knee)    Surgeons: Augusto Reyna MD Anesthesiologist: Dom Hutchins MD    Anesthesia Type: spinal ASA Status: 3            Anesthesia Type: spinal    Vitals Value Taken Time   /62 04/17/25 09:19   Temp 98.3 °F (36.8 °C) 04/17/25 09:09   Pulse 77 04/17/25 09:21   Resp 16 04/17/25 09:21   SpO2 95 % 04/17/25 09:21   Vitals shown include unfiled device data.    [facility] AN Post Evaluation:   Patient Evaluated in PACU  Patient Participation: complete - patient participated  Level of Consciousness: awake  Airway Patency:patent  Dental exam unchanged from preop  Yes    Nausea/Vomiting: none  Cardiovascular Status: acceptable  Respiratory Status: acceptable  Postoperative Hydration acceptable      Dom Hutchins MD  4/17/2025 9:22 AM

## 2025-04-17 NOTE — PHYSICAL THERAPY NOTE
PHYSICAL THERAPY KNEE EVALUATION - INPATIENT      [facility]: [facility]  Evaluation Date: 4/17/2025  Type of Evaluation: Initial  Physician Order: PT Eval and Treat    Presenting Problem: primary osteoarthritis of left knee, s/p L TKA     Reason for Therapy: Mobility Dysfunction and Discharge Planning    PHYSICAL THERAPY ASSESSMENT   Patient is a 59 year old female admitted 4/17/2025 for primary osteoarthritis of left knee, s/p L TKA. Prior to admission, patient's baseline is Independent with ADLs/iADLs/functional mobility. Patient is currently functioning below baseline with bed mobility, transfers, gait, stair negotiation, maintaining seated position, standing prolonged periods, and performing household tasks.  Patient is requiring contact guard assist and minimal assist as a result of the following impairments: decreased functional strength, decreased endurance/aerobic capacity, pain, impaired standing balance, decreased muscular endurance, medical status, limited L knee ROM, and dizziness .  Physical Therapy will continue to follow for duration of hospitalization.    Patient will benefit from continued skilled PT Services at discharge to promote prior level of function and safety with additional support and return home with home health PT.    PLAN DURING HOSPITALIZATION  Nursing Mobility Recommendation : 1 Assist  PT Device Recommendation: Rolling walker  PT Treatment Plan: Bed mobility, Body mechanics, Endurance, Energy conservation, Patient education, Gait training, Strengthening, Transfer training, Balance training  Rehab Potential : Good  Frequency (Obs): BID     PHYSICAL THERAPY MEDICAL/SOCIAL HISTORY     Problem List  Active Problems:    S/P total knee arthroplasty, left    HOME SITUATION  Type of Home: House  Home Layout: Two level  Stairs to Enter : 1   Railing: No    Stairs to Bedroom: 5    Railing: Yes    Lives With: Spouse    Drives: Yes   Patient Regularly Uses: None      SUBJECTIVE  \"I really  want to lay back down.\"    PHYSICAL THERAPY EXAMINATION   OBJECTIVE  Precautions: Limb alert - left  Fall Risk: High fall risk    WEIGHT BEARING RESTRICTION  L Lower Extremity: Weight Bearing as Tolerated    PAIN ASSESSMENT  Rating: Unable to rate  Location: left knee  Management Techniques: Activity promotion, Body mechanics, Repositioning    COGNITION  Overall Cognitive Status:  WFL - within functional limits    RANGE OF MOTION AND STRENGTH ASSESSMENT  Upper extremity ROM and strength are within functional limits   Lower extremity ROM is within functional limits; L knee limited due to pain    Lower extremity strength is within functional limits; LLE not formally assessed due to pain and recent procedure, approx 3/5     BALANCE  Static Sitting: Fair +  Dynamic Sitting: Fair  Static Standing: Fair -  Dynamic Standing: Poor +    ACTIVITY TOLERANCE  BP: (!) 84/50 (98/60 mmHg with return to supine)  BP Location: Left arm  BP Method: Automatic  Patient Position: Standing    AM-PAC '6-Clicks' INPATIENT SHORT FORM - BASIC MOBILITY  How much difficulty does the patient currently have...  Patient Difficulty: Turning over in bed (including adjusting bedclothes, sheets and blankets)?: A Little   Patient Difficulty: Sitting down on and standing up from a chair with arms (e.g., wheelchair, bedside commode, etc.): A Little   Patient Difficulty: Moving from lying on back to sitting on the side of the bed?: A Little   How much help from another person does the patient currently need...   Help from Another: Moving to and from a bed to a chair (including a wheelchair)?: A Little   Help from Another: Need to walk in hospital room?: A Little   Help from Another: Climbing 3-5 steps with a railing?: A Little     AM-PAC Score:  Raw Score: 18   Approx Degree of Impairment: 46.58%   Standardized Score (AM-PAC Scale): 43.63   CMS Modifier (G-Code): CK     FUNCTIONAL ABILITY STATUS  Functional Mobility/Gait Assessment  Gait Assistance: Not  tested  Supine to Sit: contact guard assist  Sit to Supine: minimal assist  Sit to Stand: minimal assist with RW; cues provided for appropriate hand and LLE placement during functional transfers    Exercise/Education Provided:  Bed mobility  Body mechanics  Posture  Transfer training    Skilled Therapy Provided: Patient is LLE WBAT. RN approved activity. Patient received supine in bed; agreeable to participate in therapy on this date. Patient educated on POC. Education on physiological benefits of mobility post operatively. Discussed TKA protocol, weight bearing status, precautions and education on therapeutic exercises - patient provided with handout to summarize. Patient reporting left knee pain, not quantified per the pain scale. Patient reporting nausea throughout session, no bout of emesis. With standing, patient reporting dizziness and requesting to lay back down - BP 84/50 mmHg. Assisted patient back to supine position and RN notified. BP improved to 98/60 mmHg, dizziness subsided. RN present in room at end of session. Patient scheduled to be same-day surgery, will re-assess patient this afternoon.     The patient's Approx Degree of Impairment: 46.58% has been calculated based on documentation in the [facility] '6 clicks' Inpatient Basic Mobility Short Form.  Research supports that patients with this level of impairment may benefit from HHPT.  Final disposition will be made by interdisciplinary medical team.    Patient End of Session: In bed, With  staff, Needs met, Call light within reach, RN aware of session/findings, Hospital anti-slip socks, All patient questions and concerns addressed, Family present    CURRENT GOALS  Goals to be met by: 4/22/25  Patient Goal Patient's self-stated goal is: go home   Goal #1 Patient is able to demonstrate supine - sit EOB @ level: supervision   Goal #1   Current Status    Goal #2 Patient is able to demonstrate transfers Sit to/from Stand at assistance level: supervision    Goal #2  Current Status    Goal #3 Patient is able to ambulate 300 feet with assistive device at assistance level: supervision   Goal #3   Current Status    Goal #4 Patient will negotiate 5 stairs/one curb w/ assistive device and SBA   Goal #4   Current Status    Goal #5  AROM 0 degrees extension to 95 degrees flexion L knee    Goal #5   Current Status    Goal #6 Patient independently performs home exercise program for ROM/strengthening per the instructions provided in preparation for discharge.   Goal #6  Current Status      Patient Evaluation Complexity Level:  History Low - no personal factors and/or co-morbidities   Examination of body systems Low -  addressing 1-2 elements   Clinical Presentation  Moderate - Evolving   Clinical Decision Making  Low Complexity     Therapeutic Activity:  10 minutes

## 2025-04-17 NOTE — PHYSICAL THERAPY NOTE
PHYSICAL THERAPY TREATMENT NOTE - INPATIENT     [facility]: [facility]       Presenting Problem: primary osteoarthritis of left knee, s/p L TKA    Problem List  Active Problems:    S/P total knee arthroplasty, left      PHYSICAL THERAPY ASSESSMENT   Patient demonstrates good  progress this session, goals  remain in progress.      Patient is requiring stand-by assist and contact guard assist as a result of the following impairments: decreased functional strength, decreased endurance/aerobic capacity, pain, impaired standing balance, decreased muscular endurance, medical status, and limited left knee ROM.     Patient continues to function below baseline with bed mobility, transfers, gait, stair negotiation, standing prolonged periods, and performing household tasks.  Next session anticipate patient to progress bed mobility, transfers, gait, stair negotiation, and standing prolonged periods.  Physical Therapy will continue to follow patient for duration of hospitalization.    Patient continues to benefit from continued skilled PT services: at discharge to promote prior level of function and safety with additional support and return home with home health PT.    PLAN DURING HOSPITALIZATION  Nursing Mobility Recommendation : 1 Assist  PT Device Recommendation: Rolling walker  PT Treatment Plan: Bed mobility, Body mechanics, Endurance, Energy conservation, Patient education, Gait training, Strengthening, Transfer training, Balance training  Frequency (Obs): BID     SUBJECTIVE  \"The foot and ankle is bothering me more than my knee.\"    OBJECTIVE  Precautions: Limb alert - left    WEIGHT BEARING RESTRICTION  L Lower Extremity: Weight Bearing as Tolerated    PAIN ASSESSMENT   Rating: Unable to rate  Location: left knee and left foot/ankle  Management Techniques: Activity promotion, Body mechanics, Repositioning    BALANCE  Static Sitting: Fair +  Dynamic Sitting: Fair  Static Standing: Fair  Dynamic Standing: Fair  -    ACTIVITY TOLERANCE  BP: (!) 84/50 (98/60 mmHg with return to supine)  BP Location: Left arm  BP Method: Automatic  Patient Position: Standing     AM-PAC '6-Clicks' INPATIENT SHORT FORM - BASIC MOBILITY  How much difficulty does the patient currently have...  Patient Difficulty: Turning over in bed (including adjusting bedclothes, sheets and blankets)?: A Little   Patient Difficulty: Sitting down on and standing up from a chair with arms (e.g., wheelchair, bedside commode, etc.): A Little   Patient Difficulty: Moving from lying on back to sitting on the side of the bed?: A Little   How much help from another person does the patient currently need...   Help from Another: Moving to and from a bed to a chair (including a wheelchair)?: A Little   Help from Another: Need to walk in hospital room?: A Little   Help from Another: Climbing 3-5 steps with a railing?: A Little     AM-PAC Score:  Raw Score: 18   Approx Degree of Impairment: 46.58%   Standardized Score (AM-PAC Scale): 43.63   CMS Modifier (G-Code): CK    FUNCTIONAL ABILITY STATUS  Functional Mobility/Gait Assessment  Gait Assistance: Contact guard assist  Distance (ft): 200 ft  Assistive Device: Rolling walker  Pattern: L Decreased stance time, Shuffle (decreased yomaira speed, decreased step length, step to gait pattern, very heavy reliance on RW for UE support in order to offload LLE, unsteady - two minor LOB requiring Min A to correct)  Supine to Sit: stand-by assist  Sit to Stand: contact guard assist from EOB and stand-by assist from toilet; cues provided for appropriate hand and LLE placement during functional transfers    Skilled Therapy Provided: Patient in bed with spouse present in room upon arrival. RN approved activity. Educated patient on POC and benefits of mobilization. Agreeable to participate. Patient reporting left knee and left foot/ankle pain (RN notified about left foot/ankle - RN notifying Orthopedic Surgeon, Dr. Reyna, regarding  pain), not quantified per the pain scale. Patient tolerating increased activity and ambulation distance this session, however, patient continuing to require increased assistance for all functional mobility tasks. Patient unsteady with ambulation, not safe for hands-off mobilizing just yet. Patient declining stair negotiation trial at this due to increased pain. Discussion had with patient and spouse regarding goals for same-day discharge and current functional mobility status. Patient not currently safe to return home without hands on assistance; patient and spouse not comfortable returning home at current functional level. RN aware.     The patient's Approx Degree of Impairment: 46.58% has been calculated based on documentation in the [facility] '6 clicks' Inpatient Daily Activity Short Form.  Research supports that patients with this level of impairment may benefit from HHPT.  Final disposition will be made by interdisciplinary medical team.    Patient End of Session: Up in chair, Call light within reach, Needs met, RN aware of session/findings, All patient questions and concerns addressed, Hospital anti-slip socks, Ice applied, Family present    CURRENT GOALS   Goals to be met by: 4/22/25  Patient Goal Patient's self-stated goal is: go home   Goal #1 Patient is able to demonstrate supine - sit EOB @ level: supervision   Goal #1   Current Status  SBA   Goal #2 Patient is able to demonstrate transfers Sit to/from Stand at assistance level: supervision   Goal #2  Current Status  CGA>SBA with RW   Goal #3 Patient is able to ambulate 300 feet with assistive device at assistance level: supervision   Goal #3   Current Status   ft with RW   Goal #4 Patient will negotiate 5 stairs/one curb w/ assistive device and SBA   Goal #4   Current Status  NT   Goal #5  AROM 0 degrees extension to 95 degrees flexion L knee    Goal #5   Current Status  Ongoing   Goal #6 Patient independently performs home exercise program for  ROM/strengthening per the instructions provided in preparation for discharge.   Goal #6  Current Status  Ongoing     Gait Trainin minutes  Therapeutic Activity: 10 minutes

## 2025-04-17 NOTE — ANESTHESIA PROCEDURE NOTES
Peripheral Block    Date/Time: 4/17/2025 6:53 AM    Performed by: Dom Hutchins MD  Authorized by: Dom Hutchins MD      General Information and Staff    Start Time:  4/17/2025 6:53 AM  End Time:  4/17/2025 7:03 AM  Anesthesiologist:  Dom Hutchins MD  Performed by:  Anesthesiologist  Patient Location:  PACU    Block Placement: Pre Induction  Site Identification: real time ultrasound guided and image stored and retrievable    Block site/laterality marked before start: site marked  Reason for Block: at surgeon's request and post-op pain management    Preanesthetic Checklist: 2 patient identifers, IV checked, site marked, risks and benefits discussed, monitors and equipment checked, pre-op evaluation, timeout performed, anesthesia consent, sterile technique used and no local skin infection at insertion site      Procedure Details    Patient Position:  Supine  Prep: ChloraPrep    Monitoring:  Cardiac monitor  Block Type:  Saphenous  Injection Technique:  Single-shot    Needle    Needle Type:  Echogenic  Needle Gauge:  22 G  Needle Length:  90 mm  Needle Localization:  Ultrasound guidance  Reason for Ultrasound Use: appropriate spread of the medication was noted in real time and no ultrasound evidence of intravascular and/or intraneural injection            Assessment    Injection Assessment:  Good spread noted, incremental injection, low pressure, local visualized surrounding nerve on ultrasound, negative aspiration for heme, no pain on injection and negative resistance  Paresthesia Pain:  None  Heart Rate Change: No        Medications  4/17/2025 6:53 AM  midazolam (VERSED)injection 2mg/2ml - Intravenous   2 mg - 4/17/2025 6:53:00 AM  lidocaine injection 1% - Intradermal   5 mL - 4/17/2025 6:53:00 AM  ropivacaine (NAROPIN) injection 0.5% - Infiltration   30 mL - 4/17/2025 6:53:00 AM  dexamethasone (DECADRON) PF injection 10 mg/ml - Injection   10 mg - 4/17/2025 6:53:00 AM    Additional Comments

## 2025-04-18 PROBLEM — Z01.818 PRE-OP TESTING: Status: ACTIVE | Noted: 2025-04-18

## 2025-04-18 LAB
ANION GAP SERPL CALC-SCNC: 10 MMOL/L (ref 0–18)
BASOPHILS # BLD AUTO: 0.04 X10(3) UL (ref 0–0.2)
BASOPHILS NFR BLD AUTO: 0.3 %
BUN BLD-MCNC: 18 MG/DL (ref 9–23)
BUN/CREAT SERPL: 33.3 (ref 10–20)
CALCIUM BLD-MCNC: 9 MG/DL (ref 8.7–10.4)
CHLORIDE SERPL-SCNC: 100 MMOL/L (ref 98–112)
CO2 SERPL-SCNC: 28 MMOL/L (ref 21–32)
CREAT BLD-MCNC: 0.54 MG/DL (ref 0.55–1.02)
DEPRECATED RDW RBC AUTO: 44.1 FL (ref 35.1–46.3)
EGFRCR SERPLBLD CKD-EPI 2021: 106 ML/MIN/1.73M2 (ref 60–?)
EOSINOPHIL # BLD AUTO: 0.02 X10(3) UL (ref 0–0.7)
EOSINOPHIL NFR BLD AUTO: 0.1 %
ERYTHROCYTE [DISTWIDTH] IN BLOOD BY AUTOMATED COUNT: 13.7 % (ref 11–15)
GLUCOSE BLD-MCNC: 129 MG/DL (ref 70–99)
HCT VFR BLD AUTO: 36.9 % (ref 35–48)
HGB BLD-MCNC: 12.1 G/DL (ref 12–16)
IMM GRANULOCYTES # BLD AUTO: 0.05 X10(3) UL (ref 0–1)
IMM GRANULOCYTES NFR BLD: 0.4 %
LYMPHOCYTES # BLD AUTO: 2.54 X10(3) UL (ref 1–4)
LYMPHOCYTES NFR BLD AUTO: 18 %
MCH RBC QN AUTO: 28.9 PG (ref 26–34)
MCHC RBC AUTO-ENTMCNC: 32.8 G/DL (ref 31–37)
MCV RBC AUTO: 88.1 FL (ref 80–100)
MONOCYTES # BLD AUTO: 1.09 X10(3) UL (ref 0.1–1)
MONOCYTES NFR BLD AUTO: 7.7 %
NEUTROPHILS # BLD AUTO: 10.36 X10 (3) UL (ref 1.5–7.7)
NEUTROPHILS # BLD AUTO: 10.36 X10(3) UL (ref 1.5–7.7)
NEUTROPHILS NFR BLD AUTO: 73.5 %
OSMOLALITY SERPL CALC.SUM OF ELEC: 290 MOSM/KG (ref 275–295)
PLATELET # BLD AUTO: 379 10(3)UL (ref 150–450)
POTASSIUM SERPL-SCNC: 3.7 MMOL/L (ref 3.5–5.1)
RBC # BLD AUTO: 4.19 X10(6)UL (ref 3.8–5.3)
SODIUM SERPL-SCNC: 138 MMOL/L (ref 136–145)
WBC # BLD AUTO: 14.1 X10(3) UL (ref 4–11)

## 2025-04-18 PROCEDURE — 97530 THERAPEUTIC ACTIVITIES: CPT

## 2025-04-18 PROCEDURE — 97116 GAIT TRAINING THERAPY: CPT

## 2025-04-18 PROCEDURE — 94660 CPAP INITIATION&MGMT: CPT

## 2025-04-18 PROCEDURE — 94760 N-INVAS EAR/PLS OXIMETRY 1: CPT

## 2025-04-18 PROCEDURE — 85025 COMPLETE CBC W/AUTO DIFF WBC: CPT | Performed by: INTERNAL MEDICINE

## 2025-04-18 PROCEDURE — 80048 BASIC METABOLIC PNL TOTAL CA: CPT | Performed by: INTERNAL MEDICINE

## 2025-04-18 RX ORDER — KETOROLAC TROMETHAMINE 15 MG/ML
15 INJECTION, SOLUTION INTRAMUSCULAR; INTRAVENOUS EVERY 6 HOURS PRN
Status: COMPLETED | OUTPATIENT
Start: 2025-04-18 | End: 2025-04-18

## 2025-04-18 RX ORDER — HYDROCODONE BITARTRATE AND ACETAMINOPHEN 10; 325 MG/1; MG/1
1 TABLET ORAL ONCE
Refills: 0 | Status: COMPLETED | OUTPATIENT
Start: 2025-04-18 | End: 2025-04-18

## 2025-04-18 RX ORDER — HYDROCODONE BITARTRATE AND ACETAMINOPHEN 10; 325 MG/1; MG/1
2 TABLET ORAL EVERY 6 HOURS PRN
Refills: 0 | Status: DISCONTINUED | OUTPATIENT
Start: 2025-04-18 | End: 2025-04-19

## 2025-04-18 RX ORDER — MORPHINE SULFATE 2 MG/ML
2 INJECTION, SOLUTION INTRAMUSCULAR; INTRAVENOUS ONCE
Status: COMPLETED | OUTPATIENT
Start: 2025-04-18 | End: 2025-04-18

## 2025-04-18 NOTE — PLAN OF CARE
Nava is A&Ox4 on room air. She is POD1. Aquacel dressing clean dry and intact. Teds and SCDS in place. On telemetry for history of KATHERINE. Cpap ordered. Encouraging use of incentive spirometer. On ASA. Voiding freely. Pain is being managed with PRN norco. Ambulating x1 with RW. Saline locked, on a general diet. Stayed another night due to pain control and needs to do stairs tomorrow. Call light in reach, plan of care ongoing.  Problem: Patient Centered Care  Goal: Patient preferences are identified and integrated in the patient's plan of care  Description: Interventions:- What would you like us to know as we care for you? - Provide timely, complete, and accurate information to patient/family- Incorporate patient and family knowledge, values, beliefs, and cultural backgrounds into the planning and delivery of care- Encourage patient/family to participate in care and decision-making at the level they choose- Honor patient and family perspectives and choices  Outcome: Progressing     Problem: PAIN - ADULT  Goal: Verbalizes/displays adequate comfort level or patient's stated pain goal  Description: INTERVENTIONS:- Encourage pt to monitor pain and request assistance- Assess pain using appropriate pain scale- Administer analgesics based on type and severity of pain and evaluate response- Implement non-pharmacological measures as appropriate and evaluate response- Consider cultural and social influences on pain and pain management- Manage/alleviate anxiety- Utilize distraction and/or relaxation techniques- Monitor for opioid side effects- Notify MD/LIP if interventions unsuccessful or patient reports new pain- Anticipate increased pain with activity and pre-medicate as appropriate  Outcome: Progressing     Problem: RISK FOR INFECTION - ADULT  Goal: Absence of fever/infection during anticipated neutropenic period  Description: INTERVENTIONS- Monitor WBC- Administer growth factors as ordered- Implement neutropenic  guidelines  Outcome: Progressing     Problem: SAFETY ADULT - FALL  Goal: Free from fall injury  Description: INTERVENTIONS:- Assess pt frequently for physical needs- Identify cognitive and physical deficits and behaviors that affect risk of falls.- [place] fall precautions as indicated by assessment.- Educate pt/family on patient safety including physical limitations- Instruct pt to call for assistance with activity based on assessment- Modify environment to reduce risk of injury- Provide assistive devices as appropriate- Consider OT/PT consult to assist with strengthening/mobility- Encourage toileting schedule  Outcome: Progressing     Problem: DISCHARGE PLANNING  Goal: Discharge to home or other facility with appropriate resources  Description: INTERVENTIONS:- Identify barriers to discharge w/pt and caregiver- Include patient/family/discharge partner in discharge planning- Arrange for needed discharge resources and transportation as appropriate- Identify discharge learning needs (meds, wound care, etc)- Arrange for interpreters to assist at discharge as needed- Consider post-discharge preferences of patient/family/discharge partner- Complete POLST form as appropriate- Assess patient's ability to be responsible for managing their own health- Refer to Case Management Department for coordinating discharge planning if the patient needs post-hospital services based on physician/LIP order or complex needs related to functional status, cognitive ability or social support system  Outcome: Progressing

## 2025-04-18 NOTE — CM/SW NOTE
Department  notified of request for HHC, aidin referrals started. Assigned CM/SW to follow up with pt/family on further discharge planning.      Betzaida Santoro  DSC

## 2025-04-18 NOTE — OCCUPATIONAL THERAPY NOTE
OCCUPATIONAL THERAPY TREATMENT NOTE - INPATIENT    Room Number: Room 5/Room 5-A     Presenting Problem: L TKA     Problem List  Active Problems:    S/P total knee arthroplasty, left    Pre-op testing      OCCUPATIONAL THERAPY ASSESSMENT   Patient demonstrates good  progress this session, goals progressing with 2/3 met this session.    Patient is requiring mod/minimal assist as a result of the following impairments: pain and limited Lle ROM.    Patient continues to function below baseline with adls and functional mobility. Anticipate pt will be able to return home w/ support of spouse. No further OT contact planned    PLAN DURING HOSPITALIZATION  OT Device Recommendations: TBD  OT Treatment Plan:  (discharge from OT)     SUBJECTIVE  Pt unable to identify concerns for home    OBJECTIVE  Precautions: Limb alert - left    WEIGHT BEARING RESTRICTION  L Lower Extremity: Weight Bearing as Tolerated    PAIN ASSESSMENT  Ratin (w/ activity)  Location: -- (L knee)  Management Techniques: Ice; Activity promotion; Relaxation; Repositioning    ACTIVITY TOLERANCE  Limited by pain    O2 SATURATIONS  Activity on room air    ACTIVITIES OF DAILY LIVING ASSESSMENT  AM-PAC ‘6-Clicks’ Inpatient Daily Activity Short Form  How much help from another person does the patient currently need…  -   Putting on and taking off regular lower body clothing?: A Little  -   Bathing (including washing, rinsing, drying)?: A Little  -   Toileting, which includes using toilet, bedpan or urinal? : None  -   Putting on and taking off regular upper body clothing?: None  -   Taking care of personal grooming such as brushing teeth?: None  -   Eating meals?: None    AM-PAC Score:  Score: 22  Approx Degree of Impairment: 25.8%  Standardized Score (AM-PAC Scale): 47.1  CMS Modifier (G-Code): CJ    FUNCTIONAL ADL ASSESSMENT  Eating: independent  Grooming: independent  UB Dressing: independent  LB Dressing: min assist  Toileting: independent    Skilled Therapy  Provided:  RN contacted prior to start of care. Treatment coordinated w/ PT. Pt agreeable to participation in therapy. Gait belt used during dynamic activity. Pt received up sitting eob alert and oriented x 4;spouse at bedside. Pt currently requires mod a to transfer sit<>stand from eob. Pt maintained static standing w/ rw and cga initially progressing to supervision at end of session. Pt ambulating in the johnson w/ rw and min/cga;yomaira improved w/ increased distance. Pt transferred sit<>stand from toilet w/ mod I. Pt completed tolieting task w/ mod I. Pt maintained static standing at sink level w/ supervision to complete hand hygiene. Importance of frequent position changes stressed. Use of ice encouraged. Pt verbalizing understanding    At end of session pt remaining up in chair w/ all needs in reach;ice to L knee. RN aware of pt's status and performance in therapy      EDUCATION PROVIDED  Patient Education : Role of Occupational Therapy; Functional Transfer Techniques; Fall Prevention; Plan of Care; Weight Bear Status (compensatory strategies)  Patient's Response to Education: Verbalized Understanding  Family/Caregiver's Response to Education: Verbalized Understanding    The patient's Approx Degree of Impairment: 25.8% has been calculated based on documentation in the [facility] '6 clicks' Inpatient Daily Activity Short Form.  Research supports that patients with this level of impairment may benefit from return to home.  Final disposition will be made by interdisciplinary medical team.    Patient End of Session: Up in chair, Needs met, Call light within reach, RN aware of session/findings, All patient questions and concerns addressed, Ice applied, Family present    OT Goals:       Patient will complete LE dressing with Mod I   Comment: pt required min a    Patient will complete toilet transfer with Mod I  Comment: Goal Met    Patient will complete self care task at sink level with Mod I   Comment:Goal Met              Goals  on:   Frequency:1-2 more sessions    Therapeutic Activity: 23 minutes

## 2025-04-18 NOTE — PROGRESS NOTES
[facility]  part of [facility]    Progress Note    Millie Muñiz Patient Status:  Outpatient in a Bed    1965 MRN [id number]   Location [facility] Attending Augusto Reyna MD   Hosp Day # 0 PCP Kang Rojas MD     SUBJECTIVE:  Interval History: Pt c/o pain knee, thigh and anterior ankle.  A bit better since 2nd norco given  Patient denies chest pain, shortness of breath and calf pain.    Post-Op Day: 1    OBJECTIVE:  Blood pressure 143/81, pulse 105, temperature 98.7 °F (37.1 °C), temperature source Temporal, resp. rate 19, height 5' 5\" (1.651 m), weight 245 lb (111.1 kg), SpO2 94%, not currently breastfeeding.     A&O x 3, NAD, No SOB  Sitting up in bed.      Recent Results (from the past 24 hours)   Basic Metabolic Panel (8)    Collection Time: 25  5:27 AM   Result Value Ref Range    Glucose 129 (H) 70 - 99 mg/dL    Sodium 138 136 - 145 mmol/L    Potassium 3.7 3.5 - 5.1 mmol/L    Chloride 100 98 - 112 mmol/L    CO2 28.0 21.0 - 32.0 mmol/L    Anion Gap 10 0 - 18 mmol/L    BUN 18 9 - 23 mg/dL    Creatinine 0.54 (L) 0.55 - 1.02 mg/dL    BUN/CREA Ratio 33.3 (H) 10.0 - 20.0    Calcium, Total 9.0 8.7 - 10.4 mg/dL    Calculated Osmolality 290 275 - 295 mOsm/kg    eGFR-Cr 106 >=60 mL/min/1.73m2   CBC With Differential With Platelet    Collection Time: 25  5:27 AM   Result Value Ref Range    WBC 14.1 (H) 4.0 - 11.0 x10(3) uL    RBC 4.19 3.80 - 5.30 x10(6)uL    HGB 12.1 12.0 - 16.0 g/dL    HCT 36.9 35.0 - 48.0 %    MCV 88.1 80.0 - 100.0 fL    MCH 28.9 26.0 - 34.0 pg    MCHC 32.8 31.0 - 37.0 g/dL    RDW-SD 44.1 35.1 - 46.3 fL    RDW 13.7 11.0 - 15.0 %    .0 150.0 - 450.0 10(3)uL    Neutrophil Absolute Prelim 10.36 (H) 1.50 - 7.70 x10 (3) uL    Neutrophil Absolute 10.36 (H) 1.50 - 7.70 x10(3) uL    Lymphocyte Absolute 2.54 1.00 - 4.00 x10(3) uL    Monocyte Absolute 1.09 (H) 0.10 - 1.00 x10(3) uL    Eosinophil Absolute 0.02 0.00 - 0.70 x10(3) uL    Basophil Absolute 0.04  0.00 - 0.20 x10(3) uL    Immature Granulocyte Absolute 0.05 0.00 - 1.00 x10(3) uL    Neutrophil % 73.5 %    Lymphocyte % 18.0 %    Monocyte % 7.7 %    Eosinophil % 0.1 %    Basophil % 0.3 %    Immature Granulocyte % 0.4 %        No results found.       Ortho Exam:  Dressing clean and dry.  No erythema/drainage.  Pulses 2+.   Sensation is intact.  + DF/PF/EHL.  Calf is soft and nontender. Negative Ledy's test. Compartments soft. Full ankle ROM. No fibular or tibial pain. Mile Anterior syndesmotic pain to palpation. No mid or forefoot pain.      ASSESSMENT/PLAN:    S/P Left TKA  1) Continue pain control-Norco 2 q 6 hrs  2) Continue DVT prophylaxis with 81mg asa bid x 6wks  3) Continue PT/OT  4)Toradol 15mg IV q 6 x 2 doses  5)Discharge to home when cleared by all services.    Mishel Castaneda PA-C  4/18/2025  1:27 PM

## 2025-04-18 NOTE — PHYSICAL THERAPY NOTE
PHYSICAL THERAPY KNEE TREATMENT NOTE - INPATIENT     Room Number: Room 5/Room 5-A             Presenting Problem: primary osteoarthritis of left knee, s/p L TKA       Problem List  Active Problems:    S/P total knee arthroplasty, left    Pre-op testing      PHYSICAL THERAPY ASSESSMENT   Patient demonstrates limited progress this session, goals  remain in progress.      Patient is requiring maximum assist as a result of the following impairments: decreased functional strength, decreased endurance/aerobic capacity, pain, decreased muscular endurance, and limited L knee ROM.     Patient continues to function below baseline with bed mobility, transfers, gait, stair negotiation, standing prolonged periods, and performing household tasks.  Next session anticipate patient to progress bed mobility, transfers, gait, stair negotiation, and standing prolonged periods.  Physical Therapy will continue to follow patient for duration of hospitalization.    Patient continues to benefit from continued skilled PT services: at discharge to promote prior level of function.  Anticipate patient will return home with home health PT.    PLAN DURING HOSPITALIZATION  Nursing Mobility Recommendation : 1 Assist  PT Device Recommendation: Rolling walker  PT Treatment Plan: Bed mobility, Body mechanics, Endurance, Energy conservation, Patient education, Gait training, Neuromuscular re-educate, Range of motion, Strengthening, Transfer training, Balance training  Frequency (Obs): BID     SUBJECTIVE  Pt was agreeable to be seen for today's treatment session.    OBJECTIVE  Precautions: Limb alert - left    WEIGHT BEARING STATUS  L Lower Extremity: Weight Bearing as Tolerated      PAIN ASSESSMENT   Ratin  Location: L knee  Management Techniques: Activity promotion, Body mechanics, Repositioning    BALANCE  Static Sitting: Good  Dynamic Sitting: Good  Static Standing: Fair -  Dynamic Standing: Fair -    AM-PAC '6-Clicks' INPATIENT SHORT FORM -  BASIC MOBILITY  How much difficulty does the patient currently have...  Patient Difficulty: Turning over in bed (including adjusting bedclothes, sheets and blankets)?: A Little   Patient Difficulty: Sitting down on and standing up from a chair with arms (e.g., wheelchair, bedside commode, etc.): A Lot   Patient Difficulty: Moving from lying on back to sitting on the side of the bed?: A Little   How much help from another person does the patient currently need...   Help from Another: Moving to and from a bed to a chair (including a wheelchair)?: A Little   Help from Another: Need to walk in hospital room?: A Little   Help from Another: Climbing 3-5 steps with a railing?: A Little     AM-PAC Score:  Raw Score: 17   Approx Degree of Impairment: 50.57%   Standardized Score (AM-PAC Scale): 42.13   CMS Modifier (G-Code): CK    FUNCTIONAL ABILITY STATUS  Functional Mobility/Gait Assessment  Gait Assistance: Contact guard assist  Distance (ft): 100'  Assistive Device: Rolling walker  Pattern: L Decreased stance time, Shuffle  Supine to Sit: minimal assist c L LE management  Sit to Supine: minimal assist c L LE management  Sit to Stand: maximum assist to RW    Skilled Therapy Provided: Pt received supine in bed. C/o of ankle pain. Pt required min A c L LE management going from supine to sitting at EOB. Pt required a sitting rest break. Pt performed STS c max A c focus on weight shift forward and pushing from bed. Pt achieved gait distance to 100' c CGA to impact efficient ambulation. Able to maintain erect posture during ambulatory activities resulting in safer gait pattern. Pt achieved WBAT on the L LE, utilizing a RW for stability. Pt returned to and required min A c L LE management transferring from sit to supine. Pt was provided education on HEP c LE ROM and strengthening, car transfers, and CP management. Reported pt status to RN.    The patient's Approx Degree of Impairment: 50.57% has been calculated based on  documentation in the [facility] '6 clicks' Inpatient Basic Mobility Short Form.  Research supports that patients with this level of impairment may benefit from home c HH.  Final disposition will be made by interdisciplinary medical team.    Patient End of Session: In bed, Needs met, Call light within reach, RN aware of session/findings, All patient questions and concerns addressed, Hospital anti-slip socks, Family present    Goals to be met by: 4/22/25  Patient Goal Patient's self-stated goal is: go home   Goal #1 Patient is able to demonstrate supine - sit EOB @ level: supervision   Goal #1   Current Status  not met   Goal #2 Patient is able to demonstrate transfers Sit to/from Stand at assistance level: supervision   Goal #2  Current Status  not met   Goal #3 Patient is able to ambulate 300 feet with assistive device at assistance level: supervision   Goal #3   Current Status  not met   Goal #4 Patient will negotiate 5 stairs/one curb w/ assistive device and SBA   Goal #4   Current Status  NT   Goal #5  AROM 0 degrees extension to 95 degrees flexion L knee    Goal #5   Current Status  Ongoing   Goal #6 Patient independently performs home exercise program for ROM/strengthening per the instructions provided in preparation for discharge.   Goal #6  Current Status  met     Gait Training: 15 minutes  Therapeutic Activity: 15

## 2025-04-18 NOTE — PHYSICAL THERAPY NOTE
PHYSICAL THERAPY KNEE TREATMENT NOTE - INPATIENT     Room Number: Room 5/Room 5-A             Presenting Problem: primary osteoarthritis of left knee, s/p L TKA       Problem List  Active Problems:    S/P total knee arthroplasty, left    Pre-op testing      PHYSICAL THERAPY ASSESSMENT   Patient demonstrates limited progress this session, goals  remain in progress.      Patient is requiring maximum assist as a result of the following impairments: decreased functional strength, decreased endurance/aerobic capacity, pain, decreased muscular endurance, and limited L knee ROM.     Patient continues to function below baseline with bed mobility, transfers, gait, stair negotiation, standing prolonged periods, and performing household tasks.  Next session anticipate patient to progress bed mobility, transfers, gait, stair negotiation, and standing prolonged periods.  Physical Therapy will continue to follow patient for duration of hospitalization.    Patient continues to benefit from continued skilled PT services: at discharge to promote prior level of function.  Anticipate patient will return home with home health PT.    PLAN DURING HOSPITALIZATION  Nursing Mobility Recommendation : 1 Assist  PT Device Recommendation: Rolling walker  PT Treatment Plan: Bed mobility, Body mechanics, Endurance, Energy conservation, Patient education, Family education, Gait training, Neuromuscular re-educate, Range of motion, Strengthening, Transfer training, Balance training  Frequency (Obs): BID     SUBJECTIVE  Pt reported L ankle doesn't have pain anymore. Pt was agreeable to be seen for today's treatment session.    OBJECTIVE  Precautions: Limb alert - left    WEIGHT BEARING STATUS  L Lower Extremity: Weight Bearing as Tolerated      PAIN ASSESSMENT   Ratin  Location: L knee  Management Techniques: Activity promotion, Body mechanics, Repositioning    BALANCE  Static Sitting: Good  Dynamic Sitting: Good  Static Standing: Fair -  Dynamic  Standing: Fair -     BP: 130/90  BP Location: Right arm  BP Method: Automatic  Patient Position: Sitting    AM-PAC '6-Clicks' INPATIENT SHORT FORM - BASIC MOBILITY  How much difficulty does the patient currently have...  Patient Difficulty: Turning over in bed (including adjusting bedclothes, sheets and blankets)?: A Little   Patient Difficulty: Sitting down on and standing up from a chair with arms (e.g., wheelchair, bedside commode, etc.): A Lot   Patient Difficulty: Moving from lying on back to sitting on the side of the bed?: A Little   How much help from another person does the patient currently need...   Help from Another: Moving to and from a bed to a chair (including a wheelchair)?: A Little   Help from Another: Need to walk in hospital room?: A Little   Help from Another: Climbing 3-5 steps with a railing?: Total     AM-PAC Score:  Raw Score: 15   Approx Degree of Impairment: 57.7%   Standardized Score (AM-PAC Scale): 39.45   CMS Modifier (G-Code): CK    FUNCTIONAL ABILITY STATUS  Functional Mobility/Gait Assessment  Gait Assistance: Contact guard assist (progressing to SBA)  Distance (ft): 85'  Assistive Device: Rolling walker  Pattern: L Decreased stance time  Sit to Stand: maximum assist to RW/ supervision from toilet    Skilled Therapy Provided: Pt was received sitting at EOB. Lowered bed before STS for a safer transfer. Pt performed STS c max A c focus on weight shift forward and pushing from BSC. C/o of dizziness after STS and required a standing rest break. Pt achieved gait distance to 85' c CGA progressing to SBA to impact ambulation in room and facility. Able to achieve erect posture during ambulatory activities resulting in safer gait pattern. Demonstrated decreased stance time on L LE. Did not attempt stair training due to increased pain c ambulation. Pt achieved WBAT on the L LE, utilizing a RW for stability. Pt went to the bathroom and performed a STS c supervision. Pt reported feeling ok while  static standing and performed a few standing marches before needing to return to BSC. Education provided to pt and  on DC recommendations. Reported pt status to RN.    The patient's Approx Degree of Impairment: 57.7% has been calculated based on documentation in the [facility] '6 clicks' Inpatient Basic Mobility Short Form.  Research supports that patients with this level of impairment may benefit from ROSALBA/IRF.  Final disposition will be made by interdisciplinary medical team.    Exercises AM Session PM Session   Ankle Pumps     Quad Sets     Glut Sets     Hip Abd/Add     Heel slides     Saq     SLR     Sitting Knee Flexion     Standing heel/toe raises     Standing marches 2 reps    Extension stretch  1x 1x     Patient End of Session: Up in chair, Needs met, Call light within reach, RN aware of session/findings, All patient questions and concerns addressed, Hospital anti-slip socks, Ice applied, Family present  Goals to be met by: 4/22/25  Patient Goal Patient's self-stated goal is: go home   Goal #1 Patient is able to demonstrate supine - sit EOB @ level: supervision   Goal #1   Current Status  met   Goal #2 Patient is able to demonstrate transfers Sit to/from Stand at assistance level: supervision   Goal #2  Current Status  not met   Goal #3 Patient is able to ambulate 300 feet with assistive device at assistance level: supervision   Goal #3   Current Status  not met   Goal #4 Patient will negotiate 5 stairs/one curb w/ assistive device and SBA   Goal #4   Current Status  NT   Goal #5  AROM 0 degrees extension to 95 degrees flexion L knee    Goal #5   Current Status  Ongoing   Goal #6 Patient independently performs home exercise program for ROM/strengthening per the instructions provided in preparation for discharge.   Goal #6  Current Status  met     Gait Training: 15 minutes  Therapeutic Activity: 15 minutes

## 2025-04-18 NOTE — DISCHARGE SUMMARY
General Medicine Discharge Summary     Patient ID:  Millie Muñiz  59 year old  9/1/1965    Admit date: 4/17/2025    Discharge date and time: 4/18/25    Attending Physician: Augusto Reyna MD     Consults: IP CONSULT TO HOSPITALIST  IP CONSULT TO CASE MANAGEMENT  IP CONSULT TO SOCIAL WORK    Primary Care Physician: Kang Rojas MD     Reason for admission: left knee replacement     Risk For Readmission: low    Discharge Diagnoses: Primary osteoarthritis of left knee  S/P total knee arthroplasty, left  See Additional Discharge Diagnoses in Hospital Course    Discharged Condition: stable    Follow-up with labs/images appointments: PCP, Ortho     Exam  General: Alert, no acute distress  Lungs: clear to ausculation bilaterally  Heart: Regular rate and rhythm  Abdomen: soft, non tender, non distended   Extremities: No edema, left knee dressing and brace in place   Skin: no new rash, normal color  Neuro: 5/5 strength in bilateral upper extremities, normal sensations, 5/5 left foot strength and full ROM  Psych: appropriate affect     HPI: Patient is a 60 y/o F w/ PMHx Asthma, HTN, HLD, Hypothyroid, GERD, BEBETO/MDD, KATHERINE, who presents post op.  There were plans for patient to go home after surgery if she passed physical therapy but she did not.  Seen post op and going well.  Pain is better controlled but currently has left foot pain.  Denies nausea or SOB.   at bedside.     Hospital Course:   Patient is a 60 y/o F w/ PMHx Asthma, HTN, HLD, Hypothyroid, GERD, BEBETO/MDD, KATHERINE, who presents post op s/p Left total knee arthroplasty. There were plans for patient to go home after surgery if she passed physical therapy but she did not. Having left foot pain which improved with PT.  Plans for pcp and ortho follow up.      POD#1 s/p Left total knee arthroplasty   - pain and nausea controlled  - IVF complete, advanced diet  - am labs reviewed   - PT/OT/SW  - AC per ortho      Asthma - MDI   HTN - hydrochlorothiazide  HLD - Zetia    Hypothyroid - synthroid   GERD - PPI  BEBETO/MDD - sertraline   KATHERINE - cpap as tolerated   MO - resume weight loss meds     Operative Procedures: Procedure(s) (LRB):  Left total knee arthroplasty (Left)     Imaging: No results found.    Disposition: home    Activity: as tolerated   Diet: general   Wound Care: no needs  Code Status: Full Code  O2: no needs    Home Medication Changes: as below   All discharge medications have been reconciled with current medication list.   > 30 minutes spent on dc     Med list     Medication List        CONTINUE taking these medications      ascorbic acid 1000 MG Tabs  Commonly known as: VITAMIN C     aspirin 81 MG Chew     Calcium Carbonate-Vitamin D 600-125 MG-UNIT Tabs     Cholecalciferol 50 MCG (2000 UT) Caps     ezetimibe 10 MG Tabs  Commonly known as: Zetia  TAKE 1 TABLET BY MOUTH EVERY DAY AT NIGHT     FISH OIL OR     hydroCHLOROthiazide 25 MG Tabs  TAKE 1 TABLET BY MOUTH EVERY DAY     HYDROcodone-acetaminophen  MG Tabs  Commonly known as: Norco     ketoconazole 2 % Crea  Commonly known as: Nizoral     levothyroxine 150 MCG Tabs  Commonly known as: Synthroid     Meloxicam 15 MG Tabs     Multi-Vitamin Daily Tabs     omeprazole 20 MG Cpdr  Commonly known as: PriLOSEC     ondansetron 4 MG Tbdp  Commonly known as: Zofran-ODT     pramipexole 0.25 MG Tabs  Commonly known as: Mirapex     sertraline 50 MG Tabs  Commonly known as: Zoloft  TAKE 1 TABLET BY MOUTH EVERY DAY     Zepbound 5 MG/0.5ML Soaj  Generic drug: Tirzepatide-Weight Management              FU   Follow-up Information       Kang Rojas MD Follow up in 1 week(s).    Specialty: Internal Medicine  Contact information:  [address]  [facility] [address] [phone]               Augusto Reyna MD Follow up in 1 week(s).    Specialty: SURGERY, ORTHOPEDIC  Contact information:  [address]  [address]  [place] [address] [phone]                             DC instructions:      Other Discharge  Instructions:         Keep dressing intact for 1 week, changing only if >50% saturated  Change dressing in 1 week if not previously changed  May shower with water-proof dressing intact  Keep leg elevated when not ambulating, no pillow directly under knee, keep leg straight with foot higher than knee.  Use pain medication as provided  Use over the counter stool softener AND laxative daily while taking pain medication -   Colace 100mg twice a day AND Senokot 17.2mg every night.    If no bowel movement in 2 days, obtain Magnesium Citrate and take as directed.  Start 81mg Aspirin tonight with dinner. Continue 81mg twice a day for 6 weeks.  Contact number provided in 24 hours if you have not heard from home health services for RN and physical therapy home visits  Follow-up in office in 2 weeks as scheduled      HOME INSTRUCTIONS  AMBSURG HOME CARE INSTRUCTIONS: POST-OP ANESTHESIA  The medication that you received for sedation or general anesthesia can last up to 24 hours. Your judgment and reflexes may be altered, even if you feel like your normal self.      We Recommend:   Do not drive any motor vehicle or bicycle   Avoid mowing the lawn, playing sports, or working with power tools/applicances (power saws, electric knives or mixers)   That you have someone stay with you on your first night home   Do not drink alcohol or take sleeping pills or tranquilizers   Do not sign legal documents within 24 hours of your procedure   If you had a nerve block for your surgery, take extra care not to put any pressure on your arm or hand for 24 hours    It is normal:  For you to have a sore throat if you had a breathing tube during surgery (while you were asleep!). The sore throat should get better within 48 hours. You can gargle with warm salt water (1/2 tsp in 4 oz warm water) or use a throat lozenge for comfort  To feel muscle aches or soreness especially in the abdomen, chest or neck. The achy feeling should go away in the next 24  hours  To feel weak, sleepy or \"wiped out\". Your should start feeling better in the next 24 hours.   To experience mild discomforts such as sore lip or tongue, headache, cramps, gas pains or a bloated feeling in your abdomen.   To experience mild back pain or soreness for a day or two if you had spinal or epidural anesthesia.   If you had laparoscopic surgery, to feel shoulder pain or discomfort on the day of surgery.   For some patients to have nausea after surgery/anesthesia    If you feel nausea or experience vomiting:   Try to move around less.   Eat less than usual or drink only liquids until the next morning   Nausea should resolve in about 24 hours    If you have a problem when you are at home:    Call your surgeons office   Discharge Instructions: After Your Surgery  You’ve just had surgery. During surgery, you were given medicine called anesthesia to keep you relaxed and free of pain. After surgery, you may have some pain or nausea. This is common. Here are some tips for feeling better and getting well after surgery.   Going home  Your healthcare provider will show you how to take care of yourself when you go home. They'll also answer your questions. Have an adult family member or friend drive you home. For the first 24 hours after your surgery:   Don't drive or use heavy equipment.  Don't make important decisions or sign legal papers.  Take medicines as directed.  Don't drink alcohol.  Have someone stay with you, if needed. They can watch for problems and help keep you safe.  Be sure to go to all follow-up visits with your healthcare provider. And rest after your surgery for as long as your provider tells you to.   Coping with pain  If you have pain after surgery, pain medicine will help you feel better. Take it as directed, before pain becomes severe. Also, ask your healthcare provider or pharmacist about other ways to control pain. This might be with heat, ice, or relaxation. And follow any other  instructions your surgeon or nurse gives you.      Stay on schedule with your medicine.     Tips for taking pain medicine  To get the best relief possible, remember these points:   Pain medicines can upset your stomach. Taking them with a little food may help.  Most pain relievers taken by mouth need at least 20 to 30 minutes to start to work.  Don't wait till your pain becomes severe before you take your medicine. Try to time your medicine so that you can take it before starting an activity. This might be before you get dressed, go for a walk, or sit down for dinner.  Constipation is a common side effect of some pain medicines. Call your healthcare provider before taking any medicines such as laxatives or stool softeners to help ease constipation. Also ask if you should skip any foods. Drinking lots of fluids and eating foods such as fruits and vegetables that are high in fiber can also help. Remember, don't take laxatives unless your surgeon has prescribed them.  Drinking alcohol and taking pain medicine can cause dizziness and slow your breathing. It can even be deadly. Don't drink alcohol while taking pain medicine.  Pain medicine can make you react more slowly to things. Don't drive or run machinery while taking pain medicine.  Your healthcare provider may tell you to take acetaminophen to help ease your pain. Ask them how much you're supposed to take each day. Acetaminophen or other pain relievers may interact with your prescription medicines or other over-the-counter (OTC) medicines. Some prescription medicines have acetaminophen and other ingredients in them. Using both prescription and OTC acetaminophen for pain can cause you to accidentally overdose. Read the labels on your OTC medicines with care. This will help you to clearly know the list of ingredients, how much to take, and any warnings. It may also help you not take too much acetaminophen. If you have questions or don't understand the information,  ask your pharmacist or healthcare provider to explain it to you before you take the OTC medicine.   Managing nausea  Some people have an upset stomach (nausea) after surgery. This is often because of anesthesia, pain, or pain medicine, less movement of food in the stomach, or the stress of surgery. These tips will help you handle nausea and eat healthy foods as you get better. If you were on a special food plan before surgery, ask your healthcare provider if you should follow it while you get better. Check with your provider on how your eating should progress. It may depend on the surgery you had. These general tips may help:   Don't push yourself to eat. Your body will tell you when to eat and how much.  Start off with clear liquids and soup. They're easier to digest.  Next try semi-solid foods as you feel ready. These include mashed potatoes, applesauce, and gelatin.  Slowly move to solid foods. Don’t eat fatty, rich, or spicy foods at first.  Don't force yourself to have 3 large meals a day. Instead eat smaller amounts more often.  Take pain medicines with a small amount of solid food, such as crackers or toast. This helps prevent nausea.  When to call your healthcare provider  Call your healthcare provider right away if you have any of these:   You still have too much pain, or the pain gets worse, after taking the medicine. The medicine may not be strong enough. Or there may be a complication from the surgery.  You feel too sleepy, dizzy, or groggy. The medicine may be too strong.  Side effects such as nausea or vomiting. Your healthcare provider may advise taking other medicines to .  Skin changes such as rash, itching, or hives. This may mean you have an allergic reaction. Your provider may advise taking other medicines.  The incision looks different (for instance, part of it opens up).  Bleeding or fluid leaking from the incision site, and weren't told to expect that.  Fever of 100.4°F (38°C) or higher, or as  directed by your provider.  Call 911  Call 911 right away if you have:   Trouble breathing  Facial swelling    If you have obstructive sleep apnea   You were given anesthesia medicine during surgery to keep you comfortable and free of pain. After surgery, you may have more apnea spells because of this medicine and other medicines you were given. The spells may last longer than normal.    At home:  Keep using the continuous positive airway pressure (CPAP) device when you sleep. Unless your healthcare provider tells you not to, use it when you sleep, day or night. CPAP is a common device used to treat obstructive sleep apnea.  Talk with your provider before taking any pain medicine, muscle relaxants, or sedatives. Your provider will tell you about the possible dangers of taking these medicines.  Contact your provider if your sleeping changes a lot even when taking medicines as directed.  [employer] last reviewed this educational content on 10/1/2021  © 2775-8167 [employer]. All rights reserved. This information is not intended as a substitute for professional medical care. Always follow your healthcare professional's instructions.                Patient had opportunity to ask questions and state understand and agree with therapeutic plan as outlined    Thank You,    Marcos Bain M.D.  [facility] Hospitalist

## 2025-04-18 NOTE — PROGRESS NOTES
[facility] Hospitalist Progress Note     CC: Hospital Follow up    PCP: Kang Rojas MD       Assessment/Plan:     Active Problems:    S/P total knee arthroplasty, left    Pre-op testing  Patient is a 60 y/o F w/ PMHx Asthma, HTN, HLD, Hypothyroid, GERD, BEBETO/MDD, KATHERINE, who presents post op s/p Left total knee arthroplasty.  Has not passed PT yet.      POD#1 s/p Left total knee arthroplasty   - pain and nausea controlled  - IVF complete, advanced diet  - am labs reviewed   - PT/OT/SW  - AC per ortho      Asthma - MDI   HTN - hydrochlorothiazide on dc  HLD - Zetia   Hypothyroid - synthroid   GERD - PPI  BEBETO/MDD - sertraline   KATHERINE - cpap as tolerated   MO - resume weight loss meds     Fluids: post op   Diet: ADAT  DVT prophylaxis: ASA   Code status: Full     Disposition: dc once cleared by PT     Marcos Bain MD  [facility]ist   Answering service [phone]   Subjective:     Feels better  Left knee and left foot pain is better.   No CP, SOB.     OBJECTIVE:    Blood pressure 143/81, pulse 105, temperature 98.7 °F (37.1 °C), temperature source Temporal, resp. rate 19, height 5' 5\" (1.651 m), weight 245 lb (111.1 kg), SpO2 94%, not currently breastfeeding.    Temp:  [98 °F (36.7 °C)-98.7 °F (37.1 °C)] 98.7 °F (37.1 °C)  Pulse:  [] 105  Resp:  [16-20] 19  BP: (115-143)/(66-91) 143/81  SpO2:  [90 %-94 %] 94 %      Intake/Output:    Intake/Output Summary (Last 24 hours) at 4/18/2025 1350  Last data filed at 4/18/2025 0121  Gross per 24 hour   Intake --   Output 1 ml   Net -1 ml       Last 3 Weights   04/17/25 0550 245 lb (111.1 kg)   04/15/25 1210 245 lb (111.1 kg)   03/16/22 1030 269 lb (122 kg)   11/26/21 1409 263 lb (119.3 kg)       Exam   General: Alert, no acute distress  Lungs: clear to ausculation bilaterally  Heart: Regular rate and rhythm  Abdomen: soft, non tender, non distended   Extremities: No edema, left knee dressing and brace in place   Skin: no new rash, normal color  Neuro: 5/5 strength  in bilateral upper extremities, normal sensations, 5/5 left foot strength and full ROM  Psych: appropriate affect     Data Review:       Labs:     Recent Labs   Lab 04/18/25  0527   RBC 4.19   HGB 12.1   HCT 36.9   MCV 88.1   MCH 28.9   MCHC 32.8   RDW 13.7   NEPRELIM 10.36*   WBC 14.1*   .0         Recent Labs   Lab 04/18/25  0527   *   BUN 18   CREATSERUM 0.54*   EGFRCR 106   CA 9.0      K 3.7      CO2 28.0       No results for input(s): \"ALT\", \"AST\", \"ALB\", \"AMYLASE\", \"LIPASE\", \"LDH\" in the last 168 hours.    Invalid input(s): \"ALPHOS\", \"TBIL\", \"DBIL\", \"TPROT\"      Imaging:  No results found.      Meds:     Scheduled Medications[1]  Medication Infusions[2]  PRN Medications[3]           [1]    aspirin  81 mg Oral BID    sennosides  17.2 mg Oral Nightly    docusate sodium  100 mg Oral BID    ascorbic acid  1,000 mg Oral Daily    cholecalciferol  1,000 Units Oral Daily    ezetimibe  10 mg Oral Nightly    levothyroxine  150 mcg Oral Before breakfast    multivitamin   Oral Daily    pantoprazole  40 mg Oral QAM AC    sertraline  50 mg Oral Daily   [2]    lactated ringers Stopped (04/17/25 1100)    lactated ringers Stopped (04/17/25 1100)   [3]   ketorolac    HYDROcodone-acetaminophen    sodium chloride    polyethylene glycol (PEG 3350)    magnesium hydroxide    bisacodyl    fleet enema    ondansetron    prochlorperazine    diphenhydrAMINE **OR** diphenhydrAMINE    HYDROcodone-acetaminophen    albuterol

## 2025-04-19 VITALS
WEIGHT: 245 LBS | SYSTOLIC BLOOD PRESSURE: 128 MMHG | DIASTOLIC BLOOD PRESSURE: 79 MMHG | BODY MASS INDEX: 40.82 KG/M2 | OXYGEN SATURATION: 93 % | TEMPERATURE: 99 F | HEIGHT: 65 IN | HEART RATE: 96 BPM | RESPIRATION RATE: 16 BRPM

## 2025-04-19 PROCEDURE — 94799 UNLISTED PULMONARY SVC/PX: CPT

## 2025-04-19 PROCEDURE — 97530 THERAPEUTIC ACTIVITIES: CPT

## 2025-04-19 RX ORDER — KETOROLAC TROMETHAMINE 15 MG/ML
15 INJECTION, SOLUTION INTRAMUSCULAR; INTRAVENOUS ONCE
Status: COMPLETED | OUTPATIENT
Start: 2025-04-19 | End: 2025-04-19

## 2025-04-19 NOTE — PROGRESS NOTES
[facility]  part of [facility]    Progress Note    Millie Muñiz Patient Status:  Outpatient in a Bed    1965 MRN [id number]   Location [facility] Attending Augusto Reyna MD   Hosp Day # 0 PCP Kang Rojas MD     SUBJECTIVE:  Interval History: States pain is mostly in her anterior thigh now, ankle pain has subsided. Sitting comfortably in the chair with ice on her thigh. Felt pain relief with Torodol.  Patient denies chest pain, shortness of breath and calf pain.    Post-Op Day: 2    OBJECTIVE:  Blood pressure 127/76, pulse 98, temperature 98.8 °F (37.1 °C), temperature source Oral, resp. rate 16, height 5' 5\" (1.651 m), weight 245 lb (111.1 kg), SpO2 95%, not currently breastfeeding.     A&O x 3, NAD, No SOB  Sitting up in chair.      Recent Results (from the past 8760 hours)   CBC With Differential With Platelet    Collection Time: 25  5:27 AM   Result Value Ref Range    WBC 14.1 (H) 4.0 - 11.0 x10(3) uL    RBC 4.19 3.80 - 5.30 x10(6)uL    HGB 12.1 12.0 - 16.0 g/dL    HCT 36.9 35.0 - 48.0 %    MCV 88.1 80.0 - 100.0 fL    MCH 28.9 26.0 - 34.0 pg    MCHC 32.8 31.0 - 37.0 g/dL    RDW-SD 44.1 35.1 - 46.3 fL    RDW 13.7 11.0 - 15.0 %    .0 150.0 - 450.0 10(3)uL    Neutrophil Absolute Prelim 10.36 (H) 1.50 - 7.70 x10 (3) uL    Neutrophil Absolute 10.36 (H) 1.50 - 7.70 x10(3) uL    Lymphocyte Absolute 2.54 1.00 - 4.00 x10(3) uL    Monocyte Absolute 1.09 (H) 0.10 - 1.00 x10(3) uL    Eosinophil Absolute 0.02 0.00 - 0.70 x10(3) uL    Basophil Absolute 0.04 0.00 - 0.20 x10(3) uL    Immature Granulocyte Absolute 0.05 0.00 - 1.00 x10(3) uL    Neutrophil % 73.5 %    Lymphocyte % 18.0 %    Monocyte % 7.7 %    Eosinophil % 0.1 %    Basophil % 0.3 %    Immature Granulocyte % 0.4 %     *Note: Due to a large number of results and/or encounters for the requested time period, some results have not been displayed. A complete set of results can be found in Results Review.     Post op  xray reviewed    Ortho Exam:  Dressing clean and dry.  No erythema/drainage.  Pulses 2+.   Sensation is intact.  + DF/PF/EHL.  Calf is soft and nontender. Negative Ledy's test. Compartments soft. Full ankle ROM. No fibular or tibial pain.       ASSESSMENT/PLAN:    S/P Left TKA  1) Continue pain control-Norco 2 q 6 hrs  2) Continue DVT prophylaxis with 81mg asa bid x 6wks  3) Continue PT/OT  4) Discharge to home when cleared by all services.    Elisa Yeung PA-C

## 2025-04-19 NOTE — PLAN OF CARE
Problem: Patient Centered Care  Goal: Patient preferences are identified and integrated in the patient's plan of care  Description: Interventions:- What would you like us to know as we care for you? - Provide timely, complete, and accurate information to patient/family- Incorporate patient and family knowledge, values, beliefs, and cultural backgrounds into the planning and delivery of care- Encourage patient/family to participate in care and decision-making at the level they choose- Honor patient and family perspectives and choices  Outcome: Progressing     Problem: Patient/Family Goals  Goal: Patient/Family Long Term Goal  Description: Patient's Long Term Goal: Interventions:- - See additional Care Plan goals for specific interventions  Outcome: Progressing  Goal: Patient/Family Short Term Goal  Description: Patient's Short Term Goal: Interventions: - - See additional Care Plan goals for specific interventions  Outcome: Progressing     Problem: PAIN - ADULT  Goal: Verbalizes/displays adequate comfort level or patient's stated pain goal  Description: INTERVENTIONS:- Encourage pt to monitor pain and request assistance- Assess pain using appropriate pain scale- Administer analgesics based on type and severity of pain and evaluate response- Implement non-pharmacological measures as appropriate and evaluate response- Consider cultural and social influences on pain and pain management- Manage/alleviate anxiety- Utilize distraction and/or relaxation techniques- Monitor for opioid side effects- Notify MD/LIP if interventions unsuccessful or patient reports new pain- Anticipate increased pain with activity and pre-medicate as appropriate  Outcome: Progressing     Problem: RISK FOR INFECTION - ADULT  Goal: Absence of fever/infection during anticipated neutropenic period  Description: INTERVENTIONS- Monitor WBC- Administer growth factors as ordered- Implement neutropenic guidelines  Outcome: Progressing     Problem: SAFETY  ADULT - FALL  Goal: Free from fall injury  Description: INTERVENTIONS:- Assess pt frequently for physical needs- Identify cognitive and physical deficits and behaviors that affect risk of falls.- [place] fall precautions as indicated by assessment.- Educate pt/family on patient safety including physical limitations- Instruct pt to call for assistance with activity based on assessment- Modify environment to reduce risk of injury- Provide assistive devices as appropriate- Consider OT/PT consult to assist with strengthening/mobility- Encourage toileting schedule  Outcome: Progressing     Problem: DISCHARGE PLANNING  Goal: Discharge to home or other facility with appropriate resources  Description: INTERVENTIONS:- Identify barriers to discharge w/pt and caregiver- Include patient/family/discharge partner in discharge planning- Arrange for needed discharge resources and transportation as appropriate- Identify discharge learning needs (meds, wound care, etc)- Arrange for interpreters to assist at discharge as needed- Consider post-discharge preferences of patient/family/discharge partner- Complete POLST form as appropriate- Assess patient's ability to be responsible for managing their own health- Refer to Case Management Department for coordinating discharge planning if the patient needs post-hospital services based on physician/LIP order or complex needs related to functional status, cognitive ability or social support system  Outcome: Progressing     Millie (Nava) is aware of her plan of care. Patient is alert and oriented x4, room air. CPAP at night. Pain controlled with PRN [place]. States pain is improving. CMS intact. Tolerating diet. Up with assist and walker. Plan for PT in AM with stairs. Safety measures in place, call light within reach, will continue to monitor.

## 2025-04-19 NOTE — PHYSICAL THERAPY NOTE
PHYSICAL THERAPY KNEE TREATMENT NOTE - INPATIENT     Room Number: Room 5/Room 5-A             Presenting Problem: primary osteoarthritis of left knee, s/p L TKA       Problem List  Active Problems:    S/P total knee arthroplasty, left    Pre-op testing      PHYSICAL THERAPY ASSESSMENT   Patient demonstrates good  progress this session, goals  remain in progress.      Patient is requiring supervision and contact guard assist as a result of the following impairments: decreased functional strength, decreased endurance/aerobic capacity, and pain.     Patient continues to function near baseline with bed mobility, transfers, and gait.  Next session anticipate patient to progress bed mobility, transfers, and gait.  Physical Therapy will continue to follow patient for duration of hospitalization.    Patient continues to benefit from continued skilled PT services: at discharge to promote prior level of function.  Anticipate patient will return home with home health PT.    PLAN DURING HOSPITALIZATION  Nursing Mobility Recommendation : 1 Assist  PT Device Recommendation: Rolling walker  PT Treatment Plan: Bed mobility, Body mechanics, Endurance, Energy conservation, Patient education, Family education, Gait training, Neuromuscular re-educate, Range of motion, Strengthening, Transfer training, Balance training  Frequency (Obs): BID     SUBJECTIVE  I feel better    OBJECTIVE  Precautions: Limb alert - left    WEIGHT BEARING STATUS  L Lower Extremity: Weight Bearing as Tolerated      PAIN ASSESSMENT   Ratin  Location: L knee  Management Techniques: Activity promotion    BALANCE  Static Sitting: Good  Dynamic Sitting: Good  Static Standing: Fair  Dynamic Standing: Fair    ACTIVITY TOLERANCE                         O2 WALK       AM-PAC '6-Clicks' INPATIENT SHORT FORM - BASIC MOBILITY  How much difficulty does the patient currently have...  Patient Difficulty: Turning over in bed (including adjusting bedclothes, sheets and  blankets)?: A Little   Patient Difficulty: Sitting down on and standing up from a chair with arms (e.g., wheelchair, bedside commode, etc.): A Little   Patient Difficulty: Moving from lying on back to sitting on the side of the bed?: A Little   How much help from another person does the patient currently need...   Help from Another: Moving to and from a bed to a chair (including a wheelchair)?: A Little   Help from Another: Need to walk in hospital room?: A Little   Help from Another: Climbing 3-5 steps with a railing?: A Little     AM-PAC Score:  Raw Score: 18   Approx Degree of Impairment: 46.58%   Standardized Score (AM-PAC Scale): 43.63   CMS Modifier (G-Code): CK    FUNCTIONAL ABILITY STATUS  Functional Mobility/Gait Assessment  Gait Assistance: Supervision  Distance (ft): 200  Assistive Device: Rolling walker  Pattern: L Decreased stance time  Stairs: Stairs  How Many Stairs: 4  Device: 2 Rails  Assist: Contact guard assist  Rolling: supervision  Supine to Sit: supervision  Sit to Supine: supervision  Sit to Stand: supervision    Skilled Therapy Provided: Chart reviewed. RN aware. Patient up in chair.  Discussed home safety, extension promotoin, icing, WBAT status.  Walked 20 ft with RW and sup to toilet.  After clean up, walked 200ft w/ RW and sup. Performed 4 steps with 2 rails and CGA with step to pattern.  Finished up in bed to get dressed.  All needs left within reach. RN aware.     The patient's Approx Degree of Impairment: 46.58% has been calculated based on documentation in the [facility] '6 clicks' Inpatient Basic Mobility Short Form.  Research supports that patients with this level of impairment may benefit from  PT.  Final disposition will be made by interdisciplinary medical team.    Exercises AM Session   Ankle Pumps 10 reps   Quad Sets 10 reps   Glut Sets 0 reps   Hip Abd/Add 0 reps   Heel slides 10 reps   Saq 0 reps   SLR 0 reps   Sitting Knee Flexion 5 reps   Standing heel/toe raises 0 reps    Standing knee flexion 0 reps   Extension stretch  1x            Patient End of Session: In bed, Needs met, Call light within reach, RN aware of session/findings, All patient questions and concerns addressed, SCDs in place, Hospital anti-slip socks    Goals to be met by: 25  Patient Goal Patient's self-stated goal is: go home   Goal #1 Patient is able to demonstrate supine - sit EOB @ level: supervision   Goal #1   Current Status sup   Goal #2 Patient is able to demonstrate transfers Sit to/from Stand at assistance level: supervision   Goal #2  Current Status Sup RW   Goal #3 Patient is able to ambulate 300 feet with assistive device at assistance level: supervision   Goal #3   Current Status 200 ft RW sup   Goal #4 Patient will negotiate 5 stairs/one curb w/ assistive device and SBA   Goal #4   Current Status 4 steps RW CGA   Goal #5  AROM 0 degrees extension to 95 degrees flexion L knee    Goal #5   Current Status  Ongoing   Goal #6 Patient independently performs home exercise program for ROM/strengthening per the instructions provided in preparation for discharge.   Goal #6  Current Status  met         Gait Trainin minutes  Therapeutic Activity: 40 minutes  Neuromuscular Re-education: 0 minutes  Therapeutic Exercise: 0 minutes  Canalith Repositionin minutes  Manual Therapy: 0 minutes  Can add/delete any of these

## 2025-04-19 NOTE — CM/SW NOTE
04/19/25 0900   Discharge disposition   Expected discharge disposition Home-Health   Post Acute Care Provider   ([facility])   Discharge transportation Private car     Pt discussed during nursing rounds. Pt is stable for MT today. MD MT order entered. [facility] will provide RN and therapy services at MT, agency notified of MT home today. Pt's spouse will provide transport at MT.    Plan: Home w/spouse with [facility] today.    / to remain available for support and/or discharge planning.     SERA HigginbothamN    [phone]

## 2025-04-19 NOTE — PROGRESS NOTES
[facility] Hospitalist Progress Note     CC: Hospital Follow up    PCP: Kang Rojas MD       Assessment/Plan:     Active Problems:    S/P total knee arthroplasty, left    Pre-op testing  Patient is a 58 y/o F w/ PMHx Asthma, HTN, HLD, Hypothyroid, GERD, BEBETO/MDD, KATHERINE, who presents post op s/p Left total knee arthroplasty.  Has not passed PT yet.      POD# 2 s/p Left total knee arthroplasty   - home at discharge  - asa 81mg BID for 6 weeks  - cleared medically      Asthma - MDI   HTN - hydrochlorothiazide on dc  HLD - Zetia   Hypothyroid - synthroid   GERD - PPI  BEBETO/MDD - sertraline   KATHERINE - cpap as tolerated   MO - resume weight loss meds     See discharge summary on 4/18/25      Margaret Gomez Hospitalist     Subjective:     Feels well. Working with therapy     OBJECTIVE:    Blood pressure 128/79, pulse 96, temperature 98.8 °F (37.1 °C), temperature source Oral, resp. rate 16, height 5' 5\" (1.651 m), weight 245 lb (111.1 kg), SpO2 93%, not currently breastfeeding.    Temp:  [98.8 °F (37.1 °C)-99.1 °F (37.3 °C)] 98.8 °F (37.1 °C)  Pulse:  [] 96  Resp:  [16-18] 16  BP: (127-135)/(76-90) 128/79  SpO2:  [91 %-95 %] 93 %      Intake/Output:    Intake/Output Summary (Last 24 hours) at 4/19/2025 1225  Last data filed at 4/18/2025 1955  Gross per 24 hour   Intake --   Output 600 ml   Net -600 ml       Last 3 Weights   04/17/25 0550 245 lb (111.1 kg)   04/15/25 1210 245 lb (111.1 kg)   03/16/22 1030 269 lb (122 kg)   11/26/21 1409 263 lb (119.3 kg)       Exam   General: Alert, no acute distress  Lungs: clear to ausculation bilaterally  Heart: Regular rate and rhythm  Abdomen: soft, non tender, non distended   Extremities: No edema, left knee dressing and brace in place     Data Review:       Labs:     Recent Labs   Lab 04/18/25  0527   RBC 4.19   HGB 12.1   HCT 36.9   MCV 88.1   MCH 28.9   MCHC 32.8   RDW 13.7   NEPRELIM 10.36*   WBC 14.1*   .0         Recent Labs   Lab 04/18/25  0527   *   BUN 18    CREATSERUM 0.54*   EGFRCR 106   CA 9.0      K 3.7      CO2 28.0       No results for input(s): \"ALT\", \"AST\", \"ALB\", \"AMYLASE\", \"LIPASE\", \"LDH\" in the last 168 hours.    Invalid input(s): \"ALPHOS\", \"TBIL\", \"DBIL\", \"TPROT\"      Imaging:  No results found.      Meds:     Scheduled Medications[1]  Medication Infusions[2]  PRN Medications[3]           [1]    aspirin  81 mg Oral BID    sennosides  17.2 mg Oral Nightly    docusate sodium  100 mg Oral BID    ascorbic acid  1,000 mg Oral Daily    cholecalciferol  1,000 Units Oral Daily    ezetimibe  10 mg Oral Nightly    levothyroxine  150 mcg Oral Before breakfast    multivitamin   Oral Daily    pantoprazole  40 mg Oral QAM AC    sertraline  50 mg Oral Daily   [2]    lactated ringers Stopped (04/17/25 1100)    lactated ringers Stopped (04/17/25 1100)   [3]   HYDROcodone-acetaminophen    polyethylene glycol (PEG 3350)    magnesium hydroxide    bisacodyl    fleet enema    ondansetron    prochlorperazine    diphenhydrAMINE **OR** diphenhydrAMINE    HYDROcodone-acetaminophen    albuterol

## (undated) DEVICE — DISPOSABLE TOURNIQUET CUFF SINGLE BLADDER, DUAL PORT AND QUICK CONNECT CONNECTOR: Brand: COLOR CUFF

## (undated) DEVICE — SOLUTION IRRIG 3000ML 0.9% NACL FLX CONT

## (undated) DEVICE — 3M™ IOBAN™ 2 ANTIMICROBIAL INCISE DRAPE 6651EZ: Brand: IOBAN™ 2

## (undated) DEVICE — ANTIBACTERIAL UNDYED BRAIDED (POLYGLACTIN 910), SYNTHETIC ABSORBABLE SUTURE: Brand: COATED VICRYL

## (undated) DEVICE — BANDAGE,GAUZE,BULKEE II,4.5"X4.1YD,STRL: Brand: MEDLINE

## (undated) DEVICE — PIN DRL 75MM HDLSS TRCR NXGN

## (undated) DEVICE — SCREW ORTH 2.5X25MM FEM HEX PERSONA

## (undated) DEVICE — KIT NAVITRACKER KNEE AND SPINE DISP ORTHOSOFT

## (undated) DEVICE — 3 BONE CEMENT MIXER: Brand: MIXEVAC

## (undated) DEVICE — SHEET,DRAPE,53X77,STERILE: Brand: MEDLINE

## (undated) DEVICE — SUT COAT VCRL 0 27IN CP-1 ABSRB UD 36MM 1/2

## (undated) DEVICE — SOLUTION IRRIG 1000ML 0.9% NACL USP BTL

## (undated) DEVICE — SLIM BODY SKIN STAPLER: Brand: APPOSE ULC

## (undated) DEVICE — BLADE RMR 41MM PAT SS W/ PILOT H

## (undated) DEVICE — PIN FIX L80MM DIA3.2MM FLUT CAS ORTHOSOFT

## (undated) DEVICE — 60 ML SYRINGE LUER-LOCK TIP: Brand: MONOJECT

## (undated) DEVICE — HOOD: Brand: FLYTE

## (undated) DEVICE — APPLICATOR PREP 26ML CHG 2% ISO ALC 70%

## (undated) DEVICE — WRAP COOLING KNEE W/ICE PILLOW

## (undated) DEVICE — PACK CDS TOTAL KNEE

## (undated) DEVICE — Device: Brand: STABLECUT®

## (undated) DEVICE — GAMMEX® PI HYBRID SIZE 7, STERILE POWDER-FREE SURGICAL GLOVE, POLYISOPRENE AND NEOPRENE BLEND: Brand: GAMMEX

## (undated) DEVICE — COTTON UNDERCAST PADDING,REGULAR FINISH: Brand: WEBRIL

## (undated) DEVICE — NEEDLE SPNL 18GA L3.5IN W/ QNCKE SHARPER BVL

## (undated) DEVICE — 3M™ COBAN™ NL STERILE NON-LATEX SELF-ADHERENT WRAP, 2084S, 4 IN X 5 YD (10 CM X 4,5 M), 18 ROLLS/CASE: Brand: 3M™ COBAN™

## (undated) DEVICE — SUT ETHBND XL 1 30IN OS-8 NABSRB GRN 40MM 1/2

## (undated) DEVICE — PIN FIX 3.2X150MM FLUT CAS ORTHOSOFT

## (undated) DEVICE — SCREW FIX 3.5X48MM HEX HD

## (undated) DEVICE — GAUZE,SPONGE,4"X4",12PLY,WOVEN,NS,LF: Brand: MEDLINE

## (undated) DEVICE — PAD PERINL PST FOAM DISP FOR AMSCO SURG TBL

## (undated) DEVICE — TOWEL,OR,DSP,ST,BLUE,DLX,2/PK,40PK/CS: Brand: MEDLINE

## (undated) DEVICE — DRESSING SUR 9X25CM SIL SP CVR WTRPRF VIR

## (undated) DEVICE — ENCORE® LATEX MICRO SIZE 7.5, STERILE LATEX POWDER-FREE SURGICAL GLOVE: Brand: ENCORE

## (undated) DEVICE — DRAPE ROSA ROBOTIC UN